# Patient Record
Sex: FEMALE | Race: WHITE | Employment: OTHER | URBAN - NONMETROPOLITAN AREA
[De-identification: names, ages, dates, MRNs, and addresses within clinical notes are randomized per-mention and may not be internally consistent; named-entity substitution may affect disease eponyms.]

---

## 2020-06-30 LAB
ALBUMIN SERPL-MCNC: 4.6 G/DL
ALP BLD-CCNC: 89 U/L
ALT SERPL-CCNC: 32 U/L
ANION GAP SERPL CALCULATED.3IONS-SCNC: 9 MMOL/L
AST SERPL-CCNC: 34 U/L
BASOPHILS ABSOLUTE: 0.1 /ΜL
BASOPHILS RELATIVE PERCENT: 0.7 %
BILIRUB SERPL-MCNC: 0.2 MG/DL (ref 0.1–1.4)
BILIRUBIN, URINE: NEGATIVE
BLOOD, URINE: NEGATIVE
BUN BLDV-MCNC: 7 MG/DL
CALCIUM SERPL-MCNC: 9.4 MG/DL
CHLORIDE BLD-SCNC: 101 MMOL/L
CLARITY: ABNORMAL
CO2: 28.7 MMOL/L
COLOR: YELLOW
CREAT SERPL-MCNC: 0.72 MG/DL
EOSINOPHILS ABSOLUTE: 0.1 /ΜL
EOSINOPHILS RELATIVE PERCENT: 1.3 %
GFR CALCULATED: >60
GLUCOSE BLD-MCNC: 106 MG/DL
GLUCOSE URINE: NEGATIVE
HCT VFR BLD CALC: 43.2 % (ref 36–46)
HEMOGLOBIN: 14.8 G/DL (ref 12–16)
KETONES, URINE: POSITIVE
LEUKOCYTE ESTERASE, URINE: NEGATIVE
LIPASE: 37 UNITS/L
LYMPHOCYTES ABSOLUTE: 2.6 /ΜL
LYMPHOCYTES RELATIVE PERCENT: 37.1 %
MCH RBC QN AUTO: 31 PG
MCHC RBC AUTO-ENTMCNC: 34 G/DL
MCV RBC AUTO: 91 FL
MONOCYTES ABSOLUTE: 0.6 /ΜL
MONOCYTES RELATIVE PERCENT: 8.5 %
NEUTROPHILS ABSOLUTE: 3.7 /ΜL
NEUTROPHILS RELATIVE PERCENT: 52.4 %
NITRITE, URINE: NEGATIVE
PDW BLD-RTO: 11.5 %
PH UA: 6 (ref 4.5–8)
PLATELET # BLD: 349 K/ΜL
PMV BLD AUTO: 7.5 FL
POTASSIUM SERPL-SCNC: 3.6 MMOL/L
PROTEIN UA: NEGATIVE
RBC # BLD: 4.72 10^6/ΜL
SODIUM BLD-SCNC: 139 MMOL/L
SPECIFIC GRAVITY, URINE: 1.01
TOTAL PROTEIN: 7.4
UROBILINOGEN, URINE: ABNORMAL
WBC # BLD: 7.1 10^3/ML

## 2022-05-31 SDOH — HEALTH STABILITY: PHYSICAL HEALTH: ON AVERAGE, HOW MANY MINUTES DO YOU ENGAGE IN EXERCISE AT THIS LEVEL?: 20 MIN

## 2022-05-31 SDOH — HEALTH STABILITY: PHYSICAL HEALTH: ON AVERAGE, HOW MANY DAYS PER WEEK DO YOU ENGAGE IN MODERATE TO STRENUOUS EXERCISE (LIKE A BRISK WALK)?: 3 DAYS

## 2022-05-31 ASSESSMENT — SOCIAL DETERMINANTS OF HEALTH (SDOH)

## 2022-06-01 ENCOUNTER — OFFICE VISIT (OUTPATIENT)
Dept: FAMILY MEDICINE CLINIC | Age: 77
End: 2022-06-01
Payer: COMMERCIAL

## 2022-06-01 VITALS
BODY MASS INDEX: 22.55 KG/M2 | SYSTOLIC BLOOD PRESSURE: 136 MMHG | WEIGHT: 148.8 LBS | DIASTOLIC BLOOD PRESSURE: 78 MMHG | HEART RATE: 78 BPM | TEMPERATURE: 97.3 F | HEIGHT: 68 IN | RESPIRATION RATE: 16 BRPM | OXYGEN SATURATION: 98 %

## 2022-06-01 DIAGNOSIS — Z13.220 SCREENING, LIPID: ICD-10-CM

## 2022-06-01 DIAGNOSIS — F43.9 SITUATIONAL STRESS: ICD-10-CM

## 2022-06-01 DIAGNOSIS — G47.00 INSOMNIA, UNSPECIFIED TYPE: ICD-10-CM

## 2022-06-01 DIAGNOSIS — Z00.00 HEALTHCARE MAINTENANCE: ICD-10-CM

## 2022-06-01 DIAGNOSIS — M85.852 OSTEOPENIA OF LEFT HIP: ICD-10-CM

## 2022-06-01 DIAGNOSIS — B35.1 ONYCHOMYCOSIS: ICD-10-CM

## 2022-06-01 DIAGNOSIS — Z76.89 ENCOUNTER TO ESTABLISH CARE: Primary | ICD-10-CM

## 2022-06-01 PROCEDURE — 1123F ACP DISCUSS/DSCN MKR DOCD: CPT | Performed by: FAMILY MEDICINE

## 2022-06-01 PROCEDURE — 99203 OFFICE O/P NEW LOW 30 MIN: CPT | Performed by: FAMILY MEDICINE

## 2022-06-01 SDOH — ECONOMIC STABILITY: FOOD INSECURITY: WITHIN THE PAST 12 MONTHS, THE FOOD YOU BOUGHT JUST DIDN'T LAST AND YOU DIDN'T HAVE MONEY TO GET MORE.: NEVER TRUE

## 2022-06-01 SDOH — ECONOMIC STABILITY: FOOD INSECURITY: WITHIN THE PAST 12 MONTHS, YOU WORRIED THAT YOUR FOOD WOULD RUN OUT BEFORE YOU GOT MONEY TO BUY MORE.: NEVER TRUE

## 2022-06-01 ASSESSMENT — PATIENT HEALTH QUESTIONNAIRE - PHQ9
2. FEELING DOWN, DEPRESSED OR HOPELESS: 0
1. LITTLE INTEREST OR PLEASURE IN DOING THINGS: 0
SUM OF ALL RESPONSES TO PHQ QUESTIONS 1-9: 0
SUM OF ALL RESPONSES TO PHQ9 QUESTIONS 1 & 2: 0
SUM OF ALL RESPONSES TO PHQ QUESTIONS 1-9: 0

## 2022-06-01 ASSESSMENT — SOCIAL DETERMINANTS OF HEALTH (SDOH): HOW HARD IS IT FOR YOU TO PAY FOR THE VERY BASICS LIKE FOOD, HOUSING, MEDICAL CARE, AND HEATING?: NOT HARD AT ALL

## 2022-06-01 NOTE — PROGRESS NOTES
Rajwinder Jennings (:  1945) is a 77 y.o. female,Established patient, here for evaluation of the following chief complaint(s):  New Patient       ASSESSMENT/PLAN:  1. Encounter to establish care  2. Osteopenia of left hip  3. Situational stress  -     sertraline (ZOLOFT) 50 MG tablet; Take 1 tablet by mouth daily, Disp-90 tablet, R-3Normal  4. Insomnia, unspecified type  5. Healthcare maintenance  -     Comprehensive Metabolic Panel; Future  -     Lipid Panel; Future  -     CBC with Auto Differential; Future  6. Screening, lipid  -     sertraline (ZOLOFT) 50 MG tablet; Take 1 tablet by mouth daily, Disp-90 tablet, R-3Normal  7. Onychomycosis    Healthy and active lady. She is welcomed to clinic. Lab update soon. Continue current medication -- her goal is later on to wean zoloft. She would like podiatry referral when she is ready. Return in about 1 year (around 2023). Subjective   SUBJECTIVE/OBJECTIVE:  HPI     Patient establishing with me for the first time. Was living in [place], [place]. Moving here to be closer to family.   last year. A lot of adjustment has occurred. [place] in [employer] has helped her through these difficult times. Used to live in [place]. Daughter in [place] and son here. She is looking forward to spending time with them. Last year started weaning HRT. Felt good and had no troubles with HRT however her previous physician recommended she wean. Takes zoloft for hot flashes. Then her  . She realizes the medication is helping buffer the intensity of emotions. Has helped her stay on track. Mammogram --  Normal 2022. DEXA -- hip has osteopenia. A year ago had dexa. Taking calcium with magnesium. Also vitamin D 2000 units. Also vitamin B-50 daily. Glucosamine chondroitin for joints has helped. Last labs -- a couple of years done for cholesterol. Was in hospital for actu gastritis, naproxen.   They wondered if GB, but EGD showed some irritation. 7/2020 normal HIDA. Healed slowly with medications and diet changes. Cardiac stress test then also negative. Vaccines -- [id number] booster. Pneumonia vaccines. -- not done shingles when younger   Dental care --  Every 6 months, doing well. Diet -- very healthy. Jose Bradley -- makes her own. Exercise -- in general active, steps in house of [place], garden/yard work. Walking. Sleep --  Struggling, tylenol pm. Melatonin some. Or unisom. Feeling cold feet. -- no raynauds  - podiatry needed in the future. She has fungal infection in nails. Active Ambulatory Problems     Diagnosis Date Noted    Osteopenia of left hip 06/01/2022    Situational stress 06/01/2022    Insomnia 06/01/2022    Onychomycosis 06/01/2022     Resolved Ambulatory Problems     Diagnosis Date Noted    No Resolved Ambulatory Problems     No Additional Past Medical History       History reviewed. No pertinent past medical history. History reviewed. No pertinent surgical history. Social History     Socioeconomic History    Marital status:      Spouse name: Not on file    Number of children: Not on file    Years of education: Not on file    Highest education level: Not on file   Occupational History    Not on file   Tobacco Use    Smoking status: Never Smoker    Smokeless tobacco: Never Used   Vaping Use    Vaping Use: Never used   Substance and Sexual Activity    Alcohol use: Yes    Drug use: Never    Sexual activity: Not Currently   Other Topics Concern    Not on file   Social History Narrative    Not on file     Social Determinants of Health     Financial Resource Strain: Low Risk     Difficulty of Paying Living Expenses: Not hard at all   Food Insecurity: No Food Insecurity    Worried About Running Out of Food in the Last Year: Never true    [address] in the Last Year: Never true   Transportation Needs:     Lack of Transportation (Medical):  Not on file    Lack of Transportation (Non-Medical): Not on file   Physical Activity: Insufficiently Active    Days of Exercise per Week: 3 days    Minutes of Exercise per Session: 20 min   Stress:     Feeling of Stress : Not on file   Social Connections:     Frequency of Communication with Friends and Family: Not on file    Frequency of Social Gatherings with Friends and Family: Not on file    Attends [employer] Services: Not on file    Active Member of Clubs or Organizations: Not on file    Attends Club or Organization Meetings: Not on file    Marital Status: Not on file   Intimate Partner Violence: Not At Risk    Fear of Current or Ex-Partner: No    Emotionally Abused: No    Physically Abused: No    Sexually Abused: No   Housing Stability:     Unable to Pay for Housing in the Last Year: Not on file    Number of [place] in the Last Year: Not on file    Unstable Housing in the Last Year: Not on file       Family History   Problem Relation Age of Onset    Stroke Mother     Heart Attack Father     Breast Cancer Sister        Review of Systems   Constitutional: Negative for fatigue and fever. Respiratory: Negative for shortness of breath. Cardiovascular: Negative for chest pain and leg swelling. Objective   Physical Exam  Constitutional:       General: She is not in acute distress. Appearance: Normal appearance. She is not ill-appearing. HENT:      Head: Normocephalic. Right Ear: Tympanic membrane, ear canal and external ear normal.      Left Ear: Tympanic membrane and external ear normal.      Nose: No congestion. Mouth/Throat:      Mouth: Mucous membranes are moist.   Eyes:      Extraocular Movements: Extraocular movements intact. Conjunctiva/sclera: Conjunctivae normal.      Pupils: Pupils are equal, round, and reactive to light. Cardiovascular:      Rate and Rhythm: Normal rate and regular rhythm. Heart sounds: No murmur heard.       Pulmonary:      Effort: Pulmonary effort is normal. No respiratory distress. Breath sounds: Normal breath sounds. No wheezing. Musculoskeletal:         General: No swelling. Right lower leg: No edema. Left lower leg: No edema. Neurological:      Mental Status: She is alert and oriented to person, place, and time. Psychiatric:         Mood and Affect: Mood normal.         Behavior: Behavior normal.          An electronic signature was used to authenticate this note.     --Hilaria Young MD

## 2022-06-04 ASSESSMENT — ENCOUNTER SYMPTOMS: SHORTNESS OF BREATH: 0

## 2022-06-27 ENCOUNTER — TELEPHONE (OUTPATIENT)
Dept: FAMILY MEDICINE CLINIC | Age: 77
End: 2022-06-27

## 2022-06-27 NOTE — TELEPHONE ENCOUNTER
Gus Metcalf called in requesting to get a muscle relaxer called in. She just moved from [place] last week and is having muscle spasms from all the moving and lifting things she shouldn't have been. She was in here June 1st to see Dr. Cecily Hi. Call into [facility].

## 2022-06-28 NOTE — TELEPHONE ENCOUNTER
It does not appear that a muscle relaxer was discussed at appt on 6/1. May use tylenol, ice, stretch, icy hot/bengay.     Will need appt to eval and discuss other meds, including potential side effects    Delia Carrera MD   (covering for Dr. bS Ivan)

## 2022-07-08 ENCOUNTER — HOSPITAL ENCOUNTER (OUTPATIENT)
Age: 77
Discharge: HOME | End: 2022-07-08
Payer: MEDICARE

## 2022-07-08 ENCOUNTER — TELEPHONE (OUTPATIENT)
Dept: FAMILY MEDICINE CLINIC | Age: 77
End: 2022-07-08

## 2022-07-08 ENCOUNTER — OFFICE VISIT (OUTPATIENT)
Dept: FAMILY MEDICINE CLINIC | Age: 77
End: 2022-07-08
Payer: MEDICARE

## 2022-07-08 VITALS
DIASTOLIC BLOOD PRESSURE: 64 MMHG | BODY MASS INDEX: 23.04 KG/M2 | SYSTOLIC BLOOD PRESSURE: 120 MMHG | TEMPERATURE: 97.7 F | HEIGHT: 68 IN | WEIGHT: 152 LBS | HEART RATE: 65 BPM | OXYGEN SATURATION: 99 %

## 2022-07-08 DIAGNOSIS — S46.012A STRAIN OF TENDON OF LEFT ROTATOR CUFF, INITIAL ENCOUNTER: ICD-10-CM

## 2022-07-08 DIAGNOSIS — M54.41 ACUTE MIDLINE LOW BACK PAIN WITH RIGHT-SIDED SCIATICA: Primary | ICD-10-CM

## 2022-07-08 DIAGNOSIS — Z00.00 HEALTHCARE MAINTENANCE: ICD-10-CM

## 2022-07-08 DIAGNOSIS — M25.512 ACUTE PAIN OF LEFT SHOULDER: ICD-10-CM

## 2022-07-08 DIAGNOSIS — S76.311A STRAIN OF RIGHT HAMSTRING, INITIAL ENCOUNTER: ICD-10-CM

## 2022-07-08 LAB
ALBUMIN SERPL-MCNC: 4.4 G/DL (ref 3.5–5.1)
ALP BLD-CCNC: 120 U/L (ref 38–126)
ALT SERPL-CCNC: 44 U/L (ref 11–66)
ANION GAP SERPL CALCULATED.3IONS-SCNC: 9 MEQ/L (ref 8–16)
AST SERPL-CCNC: 55 U/L (ref 5–40)
BASOPHILS # BLD: 1 %
BASOPHILS ABSOLUTE: 0.1 THOU/MM3 (ref 0–0.1)
BILIRUB SERPL-MCNC: 0.3 MG/DL (ref 0.3–1.2)
BUN BLDV-MCNC: 12 MG/DL (ref 7–22)
CALCIUM SERPL-MCNC: 9.4 MG/DL (ref 8.5–10.5)
CHLORIDE BLD-SCNC: 100 MEQ/L (ref 98–111)
CHOLESTEROL, TOTAL: 260 MG/DL (ref 100–199)
CO2: 27 MEQ/L (ref 23–33)
CREAT SERPL-MCNC: 0.7 MG/DL (ref 0.4–1.2)
EOSINOPHIL # BLD: 1.7 %
EOSINOPHILS ABSOLUTE: 0.1 THOU/MM3 (ref 0–0.4)
ERYTHROCYTE [DISTWIDTH] IN BLOOD BY AUTOMATED COUNT: 11.9 % (ref 11.5–14.5)
ERYTHROCYTE [DISTWIDTH] IN BLOOD BY AUTOMATED COUNT: 41.4 FL (ref 35–45)
GFR SERPL CREATININE-BSD FRML MDRD: 81 ML/MIN/1.73M2
GLUCOSE BLD-MCNC: 118 MG/DL (ref 70–108)
HCT VFR BLD CALC: 42.1 % (ref 37–47)
HDLC SERPL-MCNC: 69 MG/DL
HEMOGLOBIN: 13.8 GM/DL (ref 12–16)
IMMATURE GRANS (ABS): 0.01 THOU/MM3 (ref 0–0.07)
IMMATURE GRANULOCYTES: 0.2 %
LDL CHOLESTEROL CALCULATED: 170 MG/DL
LYMPHOCYTES # BLD: 26.5 %
LYMPHOCYTES ABSOLUTE: 1.6 THOU/MM3 (ref 1–4.8)
MCH RBC QN AUTO: 31.4 PG (ref 26–33)
MCHC RBC AUTO-ENTMCNC: 32.8 GM/DL (ref 32.2–35.5)
MCV RBC AUTO: 95.7 FL (ref 81–99)
MONOCYTES # BLD: 7.2 %
MONOCYTES ABSOLUTE: 0.4 THOU/MM3 (ref 0.4–1.3)
NUCLEATED RED BLOOD CELLS: 0 /100 WBC
PLATELET # BLD: 287 THOU/MM3 (ref 130–400)
PMV BLD AUTO: 10.2 FL (ref 9.4–12.4)
POTASSIUM SERPL-SCNC: 4.9 MEQ/L (ref 3.5–5.2)
RBC # BLD: 4.4 MILL/MM3 (ref 4.2–5.4)
SEG NEUTROPHILS: 63.4 %
SEGMENTED NEUTROPHILS ABSOLUTE COUNT: 3.7 THOU/MM3 (ref 1.8–7.7)
SODIUM BLD-SCNC: 136 MEQ/L (ref 135–145)
TOTAL PROTEIN: 6.9 G/DL (ref 6.1–8)
TRIGL SERPL-MCNC: 107 MG/DL (ref 0–199)
WBC # BLD: 5.9 THOU/MM3 (ref 4.8–10.8)

## 2022-07-08 PROCEDURE — 99214 OFFICE O/P EST MOD 30 MIN: CPT | Performed by: FAMILY MEDICINE

## 2022-07-08 PROCEDURE — 36415 COLL VENOUS BLD VENIPUNCTURE: CPT

## 2022-07-08 PROCEDURE — 1123F ACP DISCUSS/DSCN MKR DOCD: CPT | Performed by: FAMILY MEDICINE

## 2022-07-08 PROCEDURE — 80061 LIPID PANEL: CPT

## 2022-07-08 PROCEDURE — 85025 COMPLETE CBC W/AUTO DIFF WBC: CPT

## 2022-07-08 PROCEDURE — 80053 COMPREHEN METABOLIC PANEL: CPT

## 2022-07-08 RX ORDER — CYCLOBENZAPRINE HCL 5 MG
5 TABLET ORAL NIGHTLY PRN
Qty: 30 TABLET | Refills: 0 | Status: SHIPPED | OUTPATIENT
Start: 2022-07-08 | End: 2022-07-18

## 2022-07-08 NOTE — PROGRESS NOTES
Florentino Foreman (:  1945) is a 77 y.o. female,Established patient, here for evaluation of the following chief complaint(s):  Back Pain       ASSESSMENT/PLAN:  1. Acute midline low back pain with right-sided sciatica  2. Strain of right hamstring, initial encounter  3. Acute pain of left shoulder  -     diclofenac sodium (VOLTAREN) 1 % GEL; Apply 2 g topically 4 times daily as needed for Pain, Topical, 4 TIMES DAILY PRN Starting 2022, Disp-200 g, R-5, Normal  4. Strain of tendon of left rotator cuff, initial encounter  -     diclofenac sodium (VOLTAREN) 1 % GEL; Apply 2 g topically 4 times daily as needed for Pain, Topical, 4 TIMES DAILY PRN Starting 2022, Disp-200 g, R-5, Normal    Patient appears to have some lumbar degenerative issues affecting her right gluteal and hamstring muscles. Specific recommendations were made to improve this. She will follow these exercises. Flexeril was sent to the pharmacy. Left shoulder needs improvement of range of motion and then strengthening. Specific exercises were shown for this as well. Voltaren gel will be tried. Return in about 3 months (around 10/8/2022). Subjective   SUBJECTIVE/OBJECTIVE:  HPI     Ongoing lower back pain and tail bone pain. Still unpacking from move. A lot of moving and rearranging  Stumbled and tripped 3 times  Good shoes. Sleeps okay. No bladder and bowel trouble. No saddle anesthesia. Injured her tail bone when she was a teenage. Has been told xray shows degenerative. No stenosis to her knowledge  Spasm into right leg. No tingling/numbness into legs. But driving will worse with aching. Flexeril has helped on and off, 2018 last script. Only takes at night. Left shoulder trouble, past year  Worse pain, getting bra or picking up into cupboards. No tingling/numbness. Before move was worse. No injury/trauma. Review of Systems   no fever/chills.      Objective   Physical Exam   In general she appears to be in no acute distress, awake and oriented. Back examination feels some very mild discomfort in the paraspinal muscles of the lumbar region. Straight leg raise test is negative bilaterally but the hamstring is less flexible on the right compared to the left. Hip movements and range of motion is pretty normal but when involving the gluteal muscles there is discomfort. Piriformis test has been having but it did not radiate into the legs. An electronic signature was used to authenticate this note.     --Alice Shaw MD

## 2022-07-08 NOTE — PATIENT INSTRUCTIONS
Patient Education        Gluteal Strain: Rehab Exercises  Introduction  Here are some examples of exercises for you to try. The exercises may be suggested for a condition or for rehabilitation. Start each exercise slowly. Ease off the exercises if you start to have pain. You will be told when to start these exercises and which ones will work bestfor you. How to do the exercises  Hip rotator stretch    1. Lie on your back with both knees bent and your feet flat on the floor. 2. Put the ankle of your affected leg on your opposite thigh near your knee. 3. Use your hand to gently push the knee of your affected leg away from your body until you feel a gentle stretch around your hip. 4. Hold the stretch for 15 to 30 seconds. 5. Repeat 2 to 4 times. 6. Repeat steps 1 through 5, but this time use your hand to gently pull your knee toward your opposite shoulder. 7. Switch legs and repeat steps 1 through 6, even if only one hip is sore. Seated hip rotator stretch    1. Sit in a sturdy chair. 2. Cross your affected leg over your knee, resting your foot on top of your knee. 3. Keep your back straight, and slowly lean forward until you feel a stretch in your hip. 4. Hold for 15 to 30 seconds. 5. Switch legs and repeat steps 1 through 4 on your other side. 6. Repeat 2 to 4 times. Hamstring stretch (lying down)    1. Lie flat on your back with your legs straight. If you feel discomfort in your back, place a small towel roll under your lower back. 2. Holding the back of your affected leg, lift your leg straight up and toward your body until you feel a stretch at the back of your thigh. 3. Hold the stretch for at least 30 seconds. 4. Repeat 2 to 4 times. 5. Switch legs and repeat steps 1 through 4, even if only one hip is sore. Bridging    1. Lie on your back with both knees bent. Your knees should be bent about 90 degrees.   2. Then push your feet into the floor, squeeze your buttocks, and lift your hips off the floor until your shoulders, hips, and knees are all in a straight line. 3. Hold for about 6 seconds as you continue to breathe normally, and then slowly lower your hips back down to the floor and rest for up to 10 seconds. 4. Repeat 8 to 12 times. Single-leg bridge    1. Lie on your back, with your arms at your sides. 2. Bend one knee, and keep that foot flat on the floor. The other leg should be straight. 3. Raise the straight leg up so that the knee is level with the bent knee. 4. Tighten your belly muscles by pulling your belly button in toward your spine. Lift your buttocks up and be careful not to let your hips drop down. 5. Hold for about 6 seconds as you continue to breathe normally, and then slowly lower your hips back down to the floor. 6. Switch legs and repeat steps 1 though 5.  7. Repeat 8 to 12 times. Follow-up care is a key part of your treatment and safety. Be sure to make and go to all appointments, and call your doctor if you are having problems. It's also a good idea to know your test results and keep alist of the medicines you take. Where can you learn more? Go to https://Connollypeduuin.Sodbuster. org and sign in to your [email or web address] account. Enter T300 in the Eons box to learn more about \"Gluteal Strain: Rehab Exercises. \"     If you do not have an account, please click on the \"Sign Up Now\" link. Current as of: March 9, 2022               Content Version: 13.3  © 2006-2022 Healthwise, Incorporated. Care instructions adapted under license by [facility] [address] If you have questions about a medical condition or this instruction, always ask your healthcare professional. [place] [address] any warranty or liability for your use of this information. Patient Education        Shoulder Exercises  Introduction  Here are some examples of exercises for you to try. The exercises may be suggested for a condition or for rehabilitation.  Start each exercise slowly. Ease off the exercises if you start to have pain. You will be told when to start these exercises and which ones will work bestfor you. How to do the exercises  Posterior stretching exercise    1. Hold the elbow of your injured arm with your other hand. 2. Use your hand to pull your injured arm gently up and across your body. You will feel a gentle stretch across the back of your injured shoulder. 3. Hold for at least 15 to 30 seconds. Then slowly lower your arm. 4. Repeat 2 to 4 times. Up-the-back stretch    Your doctor or physical therapist may want you to wait to do this stretch until you have regained most of your range of motion and strength. You can do this stretch in different ways. Hold any of these stretches for at least 15 to 30seconds. Repeat them 2 to 4 times. 1. Light stretch: Put your hand in your back pocket. Let it rest there to stretch your shoulder. 2. Moderate stretch: With your other hand, hold your injured arm (palm outward) behind your back by the wrist. Pull your arm up gently to stretch your shoulder. 3. Advanced stretch: Put a towel over your other shoulder. Put the hand of your injured arm behind your back. Now hold the back end of the towel. With the other hand, hold the front end of the towel in front of your body. Pull gently on the front end of the towel. This will bring your hand farther up your back to stretch your shoulder. Overhead stretch    1. Standing about an arm's length away, grasp onto a solid surface. You could use a countertop, a doorknob, or the back of a sturdy chair. 2. With your knees slightly bent, bend forward with your arms straight. Lower your upper body, and let your shoulders stretch. 3. As your shoulders are able to stretch farther, you may need to take a step or two backward. 4. Hold for at least 15 to 30 seconds. Then stand up and relax.  If you had stepped back during your stretch, step forward so you can keep your hands on the solid surface. 5. Repeat 2 to 4 times. Shoulder flexion (lying down)    To make a wand for this exercise, use a piece of PVC pipe or a broom handlewith the broom removed. Make the wand about a foot wider than your shoulders. 1. Lie on your back, holding a wand with both hands. Your palms should face down as you hold the wand. 2. Keeping your elbows straight, slowly raise your arms over your head. Raise them until you feel a stretch in your shoulders, upper back, and chest.  3. Hold for 15 to 30 seconds. 4. Repeat 2 to 4 times. Shoulder rotation (lying down)    To make a wand for this exercise, use a piece of PVC pipe or a broom handlewith the broom removed. Make the wand about a foot wider than your shoulders. 1. Lie on your back. Hold a wand with both hands with your elbows bent and palms up. 2. Keep your elbows close to your body, and move the wand across your body toward the sore arm. 3. Hold for 8 to 12 seconds. 4. Repeat 2 to 4 times. Shoulder blade squeeze    1. Stand with your arms at your sides, and squeeze your shoulder blades together. Do not raise your shoulders up as you squeeze. 2. Hold 6 seconds. 3. Repeat 8 to 12 times. Shoulder flexor and extensor exercise    These are isometric exercises. That means you contract your muscles withoutactually moving. 1. Push forward (flex): Stand facing a wall or doorjamb, about 6 inches or less back. Hold your injured arm against your body. Make a closed fist with your thumb on top. Then gently push your hand forward into the wall with about 25% to 50% of your strength. Don't let your body move backward as you push. Hold for about 6 seconds. Relax for a few seconds. Repeat 8 to 12 times. 2. Push backward (extend): Stand with your back flat against a wall. Your upper arm should be against the wall, with your elbow bent 90 degrees (your hand straight ahead). Push your elbow gently back against the wall with about 25% to 50% of your strength.  Don't let your body move forward as you push. Hold for about 6 seconds. Relax for a few seconds. Repeat 8 to 12 times. Scapular exercise: Wall push-ups    This exercise is best done with your fingers somewhat turned out, rather thanstraight up and down. 1. Stand facing a wall, about 12 inches to 18 inches away. 2. Place your hands on the wall at shoulder height. 3. Slowly bend your elbows and bring your face to the wall. Keep your back and hips straight. 4. Push back to where you started. 5. Repeat 8 to 12 times. 6. When you can do this exercise against a wall comfortably, you can try it against a counter. You can then slowly progress to the end of a couch, then to a sturdy chair, and finally to the floor. Scapular exercise: Retraction    For this exercise, you will need elastic exercise material, such as surgicaltubing or Thera-Band. 1. Put the band around a solid object at about waist level. (A bedpost will work well.) Each hand should hold an end of the band. 2. With your elbows at your sides and bent to 90 degrees, pull the band back. Your shoulder blades should move toward each other. Then move your arms back where you started. 3. Repeat 8 to 12 times. 4. If you have good range of motion in your shoulders, try this exercise with your arms lifted out to the sides. Keep your elbows at a 90-degree angle. Raise the elastic band up to about shoulder level. Pull the band back to move your shoulder blades toward each other. Then move your arms back where you started. Internal rotator strengthening exercise    1. Start by tying a piece of elastic exercise material to a doorknob. You can use surgical tubing or Thera-Band. 2. Stand or sit with your shoulder relaxed and your elbow bent 90 degrees. Your upper arm should rest comfortably against your side. Squeeze a rolled towel between your elbow and your body for comfort. This will help keep your arm at your side.   3. Hold one end of the elastic band in the hand of the painful arm. 4. Slowly rotate your forearm toward your body until it touches your belly. Slowly move it back to where you started. 5. Keep your elbow and upper arm firmly tucked against the towel roll or at your side. 6. Repeat 8 to 12 times. External rotator strengthening exercise    1. Start by tying a piece of elastic exercise material to a doorknob. You can use surgical tubing or Thera-Band. (You may also hold one end of the band in each hand.)  2. Stand or sit with your shoulder relaxed and your elbow bent 90 degrees. Your upper arm should rest comfortably against your side. Squeeze a rolled towel between your elbow and your body for comfort. This will help keep your arm at your side. 3. Hold one end of the elastic band with the hand of the painful arm. 4. Start with your forearm across your belly. Slowly rotate the forearm out away from your body. Keep your elbow and upper arm tucked against the towel roll or the side of your body until you begin to feel tightness in your shoulder. Slowly move your arm back to where you started. 5. Repeat 8 to 12 times. Follow-up care is a key part of your treatment and safety. Be sure to make and go to all appointments, and call your doctor if you are having problems. It's also a good idea to know your test results and keep alist of the medicines you take. Where can you learn more? Go to https://SilistixpelatoyaPodTech.BeautyTicket.com. org and sign in to your Attainia account. Enter C294 in the [facility] box to learn more about \"Shoulder Bursitis: Exercises. \"     If you do not have an account, please click on the \"Sign Up Now\" link. Current as of: March 9, 2022               Content Version: 13.3  © 0100-9690 Healthwise, Incorporated. Care instructions adapted under license by [facility] ([facility]).  If you have questions about a medical condition or this instruction, always ask your healthcare professional. Raghavendra Alfonso disclaims any warranty or liability for your use of this information.

## 2022-07-08 NOTE — TELEPHONE ENCOUNTER
Amber Pump calls and she thought she was going to get a muscle relaxer sent in to [facility].

## 2022-10-14 ENCOUNTER — OFFICE VISIT (OUTPATIENT)
Dept: FAMILY MEDICINE CLINIC | Age: 77
End: 2022-10-14
Payer: MEDICARE

## 2022-10-14 VITALS
OXYGEN SATURATION: 99 % | HEIGHT: 68 IN | WEIGHT: 150 LBS | HEART RATE: 67 BPM | SYSTOLIC BLOOD PRESSURE: 122 MMHG | TEMPERATURE: 97.7 F | BODY MASS INDEX: 22.73 KG/M2 | DIASTOLIC BLOOD PRESSURE: 72 MMHG

## 2022-10-14 DIAGNOSIS — Z23 NEED FOR VACCINATION: ICD-10-CM

## 2022-10-14 DIAGNOSIS — Z00.00 WELCOME TO MEDICARE PREVENTIVE VISIT: Primary | ICD-10-CM

## 2022-10-14 DIAGNOSIS — S76.012A STRAIN OF LEFT PSOAS MUSCLE, INITIAL ENCOUNTER: ICD-10-CM

## 2022-10-14 PROCEDURE — 1123F ACP DISCUSS/DSCN MKR DOCD: CPT | Performed by: FAMILY MEDICINE

## 2022-10-14 PROCEDURE — G0402 INITIAL PREVENTIVE EXAM: HCPCS | Performed by: FAMILY MEDICINE

## 2022-10-14 PROCEDURE — G0008 ADMIN INFLUENZA VIRUS VAC: HCPCS | Performed by: FAMILY MEDICINE

## 2022-10-14 PROCEDURE — 90694 VACC AIIV4 NO PRSRV 0.5ML IM: CPT | Performed by: FAMILY MEDICINE

## 2022-10-14 RX ORDER — TERBINAFINE HYDROCHLORIDE 250 MG/1
TABLET ORAL
COMMUNITY
Start: 2022-09-12

## 2022-10-14 ASSESSMENT — PATIENT HEALTH QUESTIONNAIRE - PHQ9
SUM OF ALL RESPONSES TO PHQ QUESTIONS 1-9: 0
1. LITTLE INTEREST OR PLEASURE IN DOING THINGS: 0
2. FEELING DOWN, DEPRESSED OR HOPELESS: 0
SUM OF ALL RESPONSES TO PHQ QUESTIONS 1-9: 0
SUM OF ALL RESPONSES TO PHQ9 QUESTIONS 1 & 2: 0
SUM OF ALL RESPONSES TO PHQ QUESTIONS 1-9: 0
SUM OF ALL RESPONSES TO PHQ QUESTIONS 1-9: 0

## 2022-10-14 ASSESSMENT — LIFESTYLE VARIABLES
HOW MANY STANDARD DRINKS CONTAINING ALCOHOL DO YOU HAVE ON A TYPICAL DAY: 1 OR 2
HOW OFTEN DO YOU HAVE A DRINK CONTAINING ALCOHOL: MONTHLY OR LESS

## 2022-10-14 NOTE — PROGRESS NOTES
After obtaining consent, and per orders of Rosemary Thurman MD  Immunization(s) and/or medication(s) given during visit by Jass Reyes, [address] [place] [address]              Immunizations Administered       Name Date Dose Route    Influenza, FLUAD, (age 72 y+), Adjuvanted, 0.5mL 10/14/2022 0.5 mL Intramuscular    Site: Deltoid- Left    Lot: [id number]    NDC: 16231-968-43        Vaccine Information Sheet, \"Influenza - Inactivated\"  given to Holly Short, or parent/legal guardian of  Holly Short and verbalized understanding. Patient responses:    Have you ever had a reaction to a flu vaccine? No  Do you have an allergy to eggs, neomycin or polymixin? No  Do you have an allergy to Thimerosal, contact lens solution, or Merthiolate? No  Have you ever had Guillian [place] Syndrome? No  Do you have any current illness? No  Do you have a temperature above 100 degrees? No  Are you pregnant? No  If pregnant, permission obtained from physician? Do you have an active neurological disorder? No      Flu vaccine given per order. Please see immunization tab.

## 2022-10-14 NOTE — PATIENT INSTRUCTIONS
Staff -- please check records for pneumonia vaccines x 2 ([id number]  and prevnar 13)    Check out Psoas muscle stretches to help with hip.    14 MIN Psoas, IT & Hips Stretch with Zoila [email or web address].[email or web address]      Personalized Preventive Plan for Coleman Mclean - 10/14/2022  Medicare offers a range of preventive health benefits. Some of the tests and screenings are paid in full while other may be subject to a deductible, co-insurance, and/or copay. Some of these benefits include a comprehensive review of your medical history including lifestyle, illnesses that may run in your family, and various assessments and screenings as appropriate. After reviewing your medical record and screening and assessments performed today your provider may have ordered immunizations, labs, imaging, and/or referrals for you. A list of these orders (if applicable) as well as your Preventive Care list are included within your After Visit Summary for your review. Other Preventive Recommendations:    A preventive eye exam performed by an eye specialist is recommended every 1-2 years to screen for glaucoma; cataracts, macular degeneration, and other eye disorders. A preventive dental visit is recommended every 6 months. Try to get at least 150 minutes of exercise per week or 10,000 steps per day on a pedometer . Order or download the FREE \"Exercise & Physical Activity: Your Everyday Guide\" from [employer]. Call 6-206.178.7953 or search [employer] online. You need 2520-8097 mg of calcium and 4969-4790 IU of vitamin D per day. It is possible to meet your calcium requirement with diet alone, but a vitamin D supplement is usually necessary to meet this goal.  When exposed to the sun, use a sunscreen that protects against both UVA and UVB radiation with an SPF of 30 or greater.  Reapply every 2 to 3 hours or after sweating, drying off with a towel, or swimming. Always wear a seat belt when traveling in a car. Always wear a helmet when riding a bicycle or motorcycle.

## 2022-10-14 NOTE — PROGRESS NOTES
Medicare Annual Wellness Visit    Dee Velazquez is here for 3 Month Follow-Up    Assessment & Plan   Welcome to Medicare preventive visit  Need for vaccination  -     Influenza, FLUAD, (age 72 y+), IM, Preservative Free, 0.5 mL  Left hip pain    Recommendations for Preventive Services Due: see orders and patient instructions/AVS.    Given specific hip-psoas muscles. Will review and obtain records if necessary for pneumo vaccines    Labs normally done in July. These are within acceptable limits except for high LDL. With high HDL and low triglycerides, benefit of statin is less clear. Recommended screening schedule for the next 5-10 years is provided to the patient in written form: see Patient Instructions/AVS.     Return in 6 months (on 4/14/2023). Subjective     Doing well. Some back trouble as noted below. Patient's complete Health Risk Assessment and screening values have been reviewed and are found in Flowsheets. The following problems were reviewed today and where indicated follow up appointments were made and/or referrals ordered. Positive Risk Factor Screenings with Interventions:             General Health and ACP:  General  In general, how would you say your health is?: Very Good  In the past 7 days, have you experienced any of the following: New or Increased Pain, New or Increased Fatigue, Loneliness, Social Isolation, Stress or Anger?: (!) Yes  Select all that apply: (!) New or Increased Pain (started yoga slight back pain)  Do you get the social and emotional support that you need?: Yes  Do you have a Living Will?: Yes    Advance Directives       Power of  Living Will ACP-Advance Directive ACP-Power of     Not on File Not on File Not on File Not on File        General Health Risk Interventions:  Something aggravated on left side. Mostly when laying down and then on down along side and back of leg. This am doing well.               Objective   Vitals:    10/14/22 4561 BP: 122/72   Site: Left Upper Arm   Position: Sitting   Cuff Size: Medium Adult   Pulse: 67   Temp: 97.7 °F (36.5 °C)   SpO2: 99%   Weight: 150 lb (68 kg)   Height: 5' 8\" (1.727 m)      Body mass index is 22.81 kg/m². Gen: NAD, AAO x 3, coherent, pleasant  CTAB. RRR. Good balance and ROM of hips normal, location of intermittent pain is on low left lumbar back, then along IT band and and sciatic. No weakness. Neg SLRT. Immunization History   Administered Date(s) Administered    COVID-19, PFIZER PURPLE top, DILUTE for use, (age 15 y+), 30mcg/0.3mL 01/29/2021, 05/07/2021           Allergies   Allergen Reactions    Demerol [Meperidine Hcl] Nausea And Vomiting     Prior to Visit Medications    Medication Sig Taking?  Authorizing Provider   terbinafine (LAMISIL) 250 MG tablet take 1 tablet by mouth once daily Yes Historical Provider, MD   diclofenac sodium (VOLTAREN) 1 % GEL Apply 2 g topically 4 times daily as needed for Pain Yes Martha Aparicio MD   sertraline (ZOLOFT) 50 MG tablet Take 1 tablet by mouth daily  Patient taking differently: Take 25 mg by mouth daily 0.5 Tablet daily Yes Martha Aparicio MD       [facility] (Including outside providers/suppliers regularly involved in providing care):   Patient Care Team:  Martha Aparicio MD as PCP - General (Family Medicine)  Martha Aparicio MD as PCP - [facility] Empaneled Provider     Reviewed and updated this visit:  Tobacco  Allergies  Meds  Problems  Med Hx  Surg Hx  Soc Hx  Fam Hx

## 2022-10-30 ENCOUNTER — HOSPITAL ENCOUNTER (EMERGENCY)
Age: 77
Discharge: HOME | End: 2022-10-30
Payer: MEDICARE

## 2022-10-30 VITALS
RESPIRATION RATE: 16 BRPM | TEMPERATURE: 97.9 F | SYSTOLIC BLOOD PRESSURE: 126 MMHG | DIASTOLIC BLOOD PRESSURE: 58 MMHG | OXYGEN SATURATION: 99 % | HEART RATE: 76 BPM

## 2022-10-30 DIAGNOSIS — M62.838 SPASM OF MUSCLE: ICD-10-CM

## 2022-10-30 DIAGNOSIS — M79.605 LEFT LEG PAIN: Primary | ICD-10-CM

## 2022-10-30 PROCEDURE — 99213 OFFICE O/P EST LOW 20 MIN: CPT | Performed by: NURSE PRACTITIONER

## 2022-10-30 PROCEDURE — 99213 OFFICE O/P EST LOW 20 MIN: CPT

## 2022-10-30 RX ORDER — NAPROXEN 500 MG/1
500 TABLET ORAL 2 TIMES DAILY PRN
Qty: 60 TABLET | Refills: 0 | Status: SHIPPED | OUTPATIENT
Start: 2022-10-30 | End: 2022-12-01 | Stop reason: SDUPTHER

## 2022-10-30 RX ORDER — TIZANIDINE HYDROCHLORIDE 4 MG/1
4 CAPSULE, GELATIN COATED ORAL 3 TIMES DAILY PRN
Qty: 15 CAPSULE | Refills: 0 | Status: SHIPPED | OUTPATIENT
Start: 2022-10-30 | End: 2022-11-05 | Stop reason: SDUPTHER

## 2022-10-30 ASSESSMENT — ENCOUNTER SYMPTOMS
CONSTIPATION: 0
VOMITING: 0
COUGH: 0
BLOOD IN STOOL: 0
WHEEZING: 0
NAUSEA: 0
SHORTNESS OF BREATH: 0
ABDOMINAL PAIN: 0
EYE PAIN: 0
DIARRHEA: 0
RHINORRHEA: 0

## 2022-10-30 ASSESSMENT — PAIN - FUNCTIONAL ASSESSMENT: PAIN_FUNCTIONAL_ASSESSMENT: 0-10

## 2022-10-30 ASSESSMENT — PAIN SCALES - GENERAL: PAINLEVEL_OUTOF10: 5

## 2022-10-30 NOTE — DISCHARGE INSTRUCTIONS
Go to ER for worsening symptoms, chest pain, shortness of breath, inability keep liquids down, inability urinate for greater than 8 hours or numbness and tingling in the extremities. May take Tylenol or ibuprofen as needed for pain. Follow-up with your primary care provider.

## 2022-10-30 NOTE — ED NOTES
To [facility] with complaints of left leg pain for a week. States no real injury. Started hurting the day after she was lifting heavy blocks and mowing. Pain from buttock down to calf.  States that she took an old naproxen around 4 today and has relieved the pain     Salma Teixeira RN  10/30/22 0199

## 2022-10-30 NOTE — ED PROVIDER NOTES
[phone]       Chief Complaint   Patient presents with    Leg Pain     Left leg pain. Nurses Notes reviewed and I agree except as noted in the HPI. HISTORY OF PRESENT ILLNESS   Patricia Thacker is a 69 y.o. female who c  She states that she had a massage and it did help, but the pain got worse last night. She states that the pain is usually just when she is down to rest.  Patient states it starts in the muscle in her left hip and is like a spasm. Patient is able to move the extremity without difficulty at the hip, knee and foot. Sensation is intact all the way down to the toes. Pedal pulse and posterior tibialis pulse is 2+. Patient denies other symptoms including chest pain or shortness of breath. REVIEW OF SYSTEMS     Review of Systems   Constitutional:  Negative for appetite change, chills, fatigue, fever and unexpected weight change. HENT:  Negative for ear pain and rhinorrhea. Eyes:  Negative for pain and visual disturbance. Respiratory:  Negative for cough, shortness of breath and wheezing. Cardiovascular:  Negative for chest pain, palpitations and leg swelling. Gastrointestinal:  Negative for abdominal pain, blood in stool, constipation, diarrhea, nausea and vomiting. Genitourinary:  Negative for dysuria, frequency and hematuria. Musculoskeletal:  Positive for arthralgias. Negative for joint swelling and neck stiffness. Skin:  Negative for rash. Neurological:  Negative for dizziness, syncope, weakness, light-headedness and headaches. Hematological:  Does not bruise/bleed easily. PAST MEDICAL HISTORY   History reviewed. No pertinent past medical history. SURGICAL HISTORY     Patient  has no past surgical history on file.     CURRENT MEDICATIONS       Previous Medications    DICLOFENAC SODIUM (VOLTAREN) 1 % GEL    Apply 2 g topically 4 times daily as needed for Pain    SERTRALINE (ZOLOFT) 50 MG TABLET    Take 1 tablet by mouth daily    TERBINAFINE (LAMISIL) 250 MG TABLET    take 1 tablet by mouth once daily       ALLERGIES     Patient is is allergic to demerol [meperidine hcl]. FAMILY HISTORY     Patient'sfamily history includes Breast Cancer in her sister; Heart Attack in her father; Stroke in her mother. SOCIAL HISTORY     Patient  reports that she has never smoked. She has never used smokeless tobacco. She reports current alcohol use. She reports that she does not use drugs. PHYSICAL EXAM     ED TRIAGE VITALS  BP: (!) 126/58, Temp: 97.9 °F (36.6 °C), Heart Rate: 76, Resp: 16, SpO2: 99 %  Physical Exam  Vitals and nursing note reviewed. Constitutional:       Appearance: She is not diaphoretic. HENT:      Head: Normocephalic and atraumatic. Right Ear: External ear normal.      Left Ear: External ear normal.   Eyes:      Conjunctiva/sclera: Conjunctivae normal.   Pulmonary:      Effort: Pulmonary effort is normal. No respiratory distress. Abdominal:      General: There is no distension. Musculoskeletal:      Cervical back: Normal range of motion. Left hip: Tenderness present. No bony tenderness. Normal range of motion. Normal strength. Left upper leg: No deformity, tenderness or bony tenderness. Left knee: No swelling. Normal range of motion. Skin:     General: Skin is warm and dry. Neurological:      Mental Status: She is alert. DIAGNOSTIC RESULTS   Labs:No results found for this visit on 10/30/22. IMAGING:  No orders to display     URGENT CARE COURSE:        MDM      Patient is to urgent care with complaint of left leg pain. Patient describes the pain as a muscle tightening or spasm. We will treat with tizanidine. Patient is instructed to take this as needed and be careful as it may make her tired. Patient also given a prescription for naproxen to help with anti-inflammatory.     Go to ER for worsening symptoms, chest pain, shortness of breath, inability keep liquids down, inability urinate for greater than 8 hours or numbness and tingling in the extremities. May take Tylenol or ibuprofen as needed for pain. Follow-up with your primary care provider. Medications - No data to display  PROCEDURES:    Procedures    FINALIMPRESSION      1. Left leg pain    2.  Spasm of muscle        DISPOSITION/PLAN   DISPOSITION Decision To Discharge 10/30/2022 10:25:02 AM    PATIENT REFERRED TO:  Lazarus Osgood, MD  [address] [place] [address]  [phone]    In 2 days    DISCHARGE MEDICATIONS:  New Prescriptions    NAPROXEN (NAPROSYN) 500 MG TABLET    Take 1 tablet by mouth 2 times daily as needed for Pain    TIZANIDINE (ZANAFLEX) 4 MG CAPSULE    Take 1 capsule by mouth 3 times daily as needed for Muscle spasms     Current Discharge Medication List          SIDDHARTHA Zhang CNP, APRN - CNP  10/30/22 1035

## 2022-10-31 ENCOUNTER — OFFICE VISIT (OUTPATIENT)
Dept: FAMILY MEDICINE CLINIC | Age: 77
End: 2022-10-31
Payer: MEDICARE

## 2022-10-31 VITALS
BODY MASS INDEX: 23.64 KG/M2 | SYSTOLIC BLOOD PRESSURE: 130 MMHG | HEART RATE: 82 BPM | RESPIRATION RATE: 16 BRPM | HEIGHT: 68 IN | DIASTOLIC BLOOD PRESSURE: 80 MMHG | WEIGHT: 156 LBS | OXYGEN SATURATION: 99 % | TEMPERATURE: 97.1 F

## 2022-10-31 DIAGNOSIS — M54.32 SCIATICA OF LEFT SIDE: Primary | ICD-10-CM

## 2022-10-31 PROCEDURE — 99213 OFFICE O/P EST LOW 20 MIN: CPT | Performed by: NURSE PRACTITIONER

## 2022-10-31 PROCEDURE — 1123F ACP DISCUSS/DSCN MKR DOCD: CPT | Performed by: NURSE PRACTITIONER

## 2022-10-31 PROCEDURE — 96372 THER/PROPH/DIAG INJ SC/IM: CPT | Performed by: NURSE PRACTITIONER

## 2022-10-31 RX ORDER — TIZANIDINE 4 MG/1
TABLET ORAL
COMMUNITY
Start: 2022-10-30 | End: 2022-11-05

## 2022-10-31 RX ORDER — METHYLPREDNISOLONE ACETATE 40 MG/ML
80 INJECTION, SUSPENSION INTRA-ARTICULAR; INTRALESIONAL; INTRAMUSCULAR; SOFT TISSUE ONCE
Status: COMPLETED | OUTPATIENT
Start: 2022-10-31 | End: 2022-10-31

## 2022-10-31 RX ORDER — METHYLPREDNISOLONE ACETATE 80 MG/ML
80 INJECTION, SUSPENSION INTRA-ARTICULAR; INTRALESIONAL; INTRAMUSCULAR; SOFT TISSUE ONCE
Status: DISCONTINUED | OUTPATIENT
Start: 2022-10-31 | End: 2022-11-05

## 2022-10-31 RX ADMIN — METHYLPREDNISOLONE ACETATE 80 MG: 40 INJECTION, SUSPENSION INTRA-ARTICULAR; INTRALESIONAL; INTRAMUSCULAR; SOFT TISSUE at 16:06

## 2022-10-31 NOTE — PROGRESS NOTES
Pierre Hardy (:  1945) is a 69 y.o. female,Established patient, here for evaluation of the following chief complaint(s):  Back Pain (Went to  yesterday and saw Chiropractor today )         ASSESSMENT/PLAN:  1. Sciatica of left side  -     methylPREDNISolone acetate (DEPO-MEDROL) injection 80 mg; 80 mg, IntraMUSCular, ONCE, 1 dose, On Mon 10/31/22 at [address]    Return if symptoms worsen or fail to improve. Continue stretching exercises and walking. PT for gait training, strength and stretching. Subjective   SUBJECTIVE/OBJECTIVE:  HPI  The patient presents for evaluation of left side back pain with radiation into the posterior thigh. Symptoms ongoing for a couple of weeks. She notes symptoms started after lifting some boxes and raking leaves. No new weakness or numbness and tingling. Review of Systems   Constitutional: Negative. HENT: Negative. Genitourinary: Negative. Musculoskeletal:  Positive for back pain. Negative for joint swelling. Objective   Physical Exam  Constitutional:       General: She is not in acute distress. Appearance: Normal appearance. She is normal weight. She is not ill-appearing. Cardiovascular:      Rate and Rhythm: Normal rate and regular rhythm. Pulses: Normal pulses. Heart sounds: Normal heart sounds. Pulmonary:      Effort: Pulmonary effort is normal.      Breath sounds: Normal breath sounds. Musculoskeletal:      Lumbar back: Tenderness present. No swelling, deformity or spasms. Decreased range of motion. Negative right straight leg raise test and negative left straight leg raise test.      Comments: Left SI region   Skin:     General: Skin is warm and dry. Capillary Refill: Capillary refill takes less than 2 seconds. Neurological:      General: No focal deficit present. Mental Status: She is alert and oriented to person, place, and time.                 An electronic signature was used to authenticate this note.     --Aleksandra Miramontes, APRN - CNP

## 2022-10-31 NOTE — PROGRESS NOTES
Medication(s) given during visit:    Administrations This Visit       methylPREDNISolone acetate (DEPO-MEDROL) injection 80 mg       Admin Date  10/31/2022  16:06 Action  Given Dose  80 mg Route  IntraMUSCular Site  Ventrogluteal Left  Administered By  Duane Ortega MA    Ordering Provider: SIDDHARTHA Mckee CNP    NDC: 7685-7444-66    Lot#: [id number]    : Mathis Koyanagi. Patient Supplied?: No                    Patient instructed to remain in clinic for 20 minutes after injection and was advised to report any adverse reaction to me immediately.

## 2022-11-01 ASSESSMENT — ENCOUNTER SYMPTOMS: BACK PAIN: 1

## 2022-11-05 ENCOUNTER — HOSPITAL ENCOUNTER (EMERGENCY)
Age: 77
Discharge: HOME | End: 2022-11-05
Payer: MEDICARE

## 2022-11-05 ENCOUNTER — APPOINTMENT (OUTPATIENT)
Dept: GENERAL RADIOLOGY | Age: 77
End: 2022-11-05
Payer: MEDICARE

## 2022-11-05 VITALS
TEMPERATURE: 98.3 F | RESPIRATION RATE: 18 BRPM | SYSTOLIC BLOOD PRESSURE: 131 MMHG | OXYGEN SATURATION: 97 % | HEART RATE: 63 BPM | DIASTOLIC BLOOD PRESSURE: 60 MMHG

## 2022-11-05 DIAGNOSIS — M54.42 ACUTE LEFT-SIDED BACK PAIN WITH SCIATICA: Primary | ICD-10-CM

## 2022-11-05 PROCEDURE — 96372 THER/PROPH/DIAG INJ SC/IM: CPT

## 2022-11-05 PROCEDURE — 72100 X-RAY EXAM L-S SPINE 2/3 VWS: CPT

## 2022-11-05 PROCEDURE — 99213 OFFICE O/P EST LOW 20 MIN: CPT

## 2022-11-05 PROCEDURE — 99214 OFFICE O/P EST MOD 30 MIN: CPT | Performed by: NURSE PRACTITIONER

## 2022-11-05 PROCEDURE — 6360000002 HC RX W HCPCS: Performed by: NURSE PRACTITIONER

## 2022-11-05 RX ORDER — PREDNISONE 20 MG/1
20 TABLET ORAL 2 TIMES DAILY
Qty: 10 TABLET | Refills: 0 | Status: SHIPPED | OUTPATIENT
Start: 2022-11-05 | End: 2022-11-10

## 2022-11-05 RX ORDER — KETOROLAC TROMETHAMINE 30 MG/ML
30 INJECTION, SOLUTION INTRAMUSCULAR; INTRAVENOUS ONCE
Status: COMPLETED | OUTPATIENT
Start: 2022-11-05 | End: 2022-11-05

## 2022-11-05 RX ORDER — LIDOCAINE 4 G/G
1 PATCH TOPICAL EVERY 24 HOURS
Qty: 30 PATCH | Refills: 0 | Status: SHIPPED | OUTPATIENT
Start: 2022-11-05 | End: 2022-11-16 | Stop reason: SDUPTHER

## 2022-11-05 RX ORDER — TIZANIDINE HYDROCHLORIDE 4 MG/1
4 CAPSULE, GELATIN COATED ORAL 3 TIMES DAILY PRN
Qty: 45 CAPSULE | Refills: 0 | Status: SHIPPED | OUTPATIENT
Start: 2022-11-05 | End: 2022-11-16 | Stop reason: SDUPTHER

## 2022-11-05 RX ORDER — TRAMADOL HYDROCHLORIDE 50 MG/1
50 TABLET ORAL EVERY 6 HOURS PRN
Qty: 12 TABLET | Refills: 0 | Status: SHIPPED | OUTPATIENT
Start: 2022-11-05 | End: 2022-11-08

## 2022-11-05 RX ADMIN — KETOROLAC TROMETHAMINE 30 MG: 30 INJECTION, SOLUTION INTRAMUSCULAR; INTRAVENOUS at 08:29

## 2022-11-05 ASSESSMENT — ENCOUNTER SYMPTOMS
NAUSEA: 0
COUGH: 0
ALLERGIC/IMMUNOLOGIC NEGATIVE: 1
BACK PAIN: 0
SORE THROAT: 0
RHINORRHEA: 0
EYE REDNESS: 0
CONSTIPATION: 0
DIARRHEA: 0
SHORTNESS OF BREATH: 0
ABDOMINAL PAIN: 0
VOMITING: 0
EYE PAIN: 0
EYE DISCHARGE: 0
TROUBLE SWALLOWING: 0
WHEEZING: 0

## 2022-11-05 ASSESSMENT — PAIN DESCRIPTION - ORIENTATION: ORIENTATION: LOWER

## 2022-11-05 ASSESSMENT — PAIN SCALES - GENERAL: PAINLEVEL_OUTOF10: 7

## 2022-11-05 ASSESSMENT — PAIN DESCRIPTION - PAIN TYPE: TYPE: ACUTE PAIN

## 2022-11-05 ASSESSMENT — PAIN DESCRIPTION - LOCATION: LOCATION: BACK

## 2022-11-05 ASSESSMENT — PAIN DESCRIPTION - FREQUENCY: FREQUENCY: CONTINUOUS

## 2022-11-05 ASSESSMENT — PAIN - FUNCTIONAL ASSESSMENT: PAIN_FUNCTIONAL_ASSESSMENT: 0-10

## 2022-11-05 ASSESSMENT — PAIN DESCRIPTION - DIRECTION: RADIATING_TOWARDS: LEFT LEG

## 2022-11-05 NOTE — ED PROVIDER NOTES
[facility]  Urgent Care Encounter      CHIEF COMPLAINT       Chief Complaint   Patient presents with    Back Pain       Nurses Notes reviewed and I agree except as noted in the HPI. HISTORY OF PRESENT ILLNESS   Nae Ramirez is a 69 y.o. female who presents complaint of ongoing acute low back pain with sciatica on the left side. Patient has been seen on October 30 in urgent care, October 31 and primary care. Patient has been prescribed NSAIDs, given a Depo-Medrol injection, orders for physical therapy. Patient continues to have severe pain with radiation down to her left leg, not sleeping well and is not managed with current medications. No imaging has been completed yet. REVIEW OF SYSTEMS     Review of Systems   Constitutional:  Negative for activity change, fatigue and fever. HENT:  Negative for congestion, ear pain, rhinorrhea, sore throat and trouble swallowing. Eyes:  Negative for pain, discharge and redness. Respiratory:  Negative for cough, shortness of breath and wheezing. Cardiovascular: Negative. Gastrointestinal:  Negative for abdominal pain, constipation, diarrhea, nausea and vomiting. Endocrine: Negative. Genitourinary:  Negative for dysuria, frequency and urgency. Musculoskeletal:  Negative for arthralgias, back pain and myalgias. Skin:  Negative for rash. Allergic/Immunologic: Negative. Neurological:  Negative for dizziness, tremors, weakness and headaches. Hematological: Negative. Psychiatric/Behavioral:  Negative for dysphoric mood and sleep disturbance. The patient is not nervous/anxious. PAST MEDICAL HISTORY   History reviewed. No pertinent past medical history. SURGICAL HISTORY     Patient  has no past surgical history on file.     CURRENT MEDICATIONS       Discharge Medication List as of 11/5/2022  9:11 AM        CONTINUE these medications which have NOT CHANGED    Details   tiZANidine (ZANAFLEX) 4 MG tablet take 1 tablet by mouth three times a day if needed muscle spasmHistorical Med      naproxen (NAPROSYN) 500 MG tablet Take 1 tablet by mouth 2 times daily as needed for Pain, Disp-60 tablet, R-0Normal      tiZANidine (ZANAFLEX) 4 MG capsule Take 1 capsule by mouth 3 times daily as needed for Muscle spasms, Disp-15 capsule, R-0Normal      terbinafine (LAMISIL) 250 MG tablet take 1 tablet by mouth once dailyHistorical Med      diclofenac sodium (VOLTAREN) 1 % GEL Apply 2 g topically 4 times daily as needed for Pain, Topical, 4 TIMES DAILY PRN Starting Fri 7/8/2022, Disp-200 g, R-5, Normal      sertraline (ZOLOFT) 50 MG tablet Take 1 tablet by mouth daily, Disp-90 tablet, R-3Normal             ALLERGIES     Patient is is allergic to demerol [meperidine hcl]. FAMILY HISTORY     Patient'sfamily history includes Breast Cancer in her sister; Heart Attack in her father; Stroke in her mother. SOCIAL HISTORY     Patient  reports that she has never smoked. She has never used smokeless tobacco. She reports current alcohol use. She reports that she does not use drugs. PHYSICAL EXAM     ED TRIAGE VITALS  BP: 131/60, Temp: 98.3 °F (36.8 °C), Heart Rate: 63, Resp: 18, SpO2: 97 %  Physical Exam  Vitals and nursing note reviewed. Constitutional:       General: She is not in acute distress. Appearance: She is well-developed. She is not ill-appearing or diaphoretic. HENT:      Right Ear: External ear normal.      Left Ear: External ear normal.      Nose: Nose normal.   Eyes:      General:         Right eye: No discharge. Left eye: No discharge. Conjunctiva/sclera: Conjunctivae normal.      Pupils: Pupils are equal, round, and reactive to light. Neck:      Vascular: No JVD. Cardiovascular:      Rate and Rhythm: Normal rate and regular rhythm. Pulmonary:      Effort: Pulmonary effort is normal. No respiratory distress. Musculoskeletal:         General: No tenderness or deformity. Normal range of motion. Cervical back: Normal range of motion. Skin:     General: Skin is warm and dry. Capillary Refill: Capillary refill takes less than 2 seconds. Coloration: Skin is not pale. Findings: No erythema or rash. Neurological:      Mental Status: She is alert and oriented to person, place, and time. Coordination: Coordination normal.   Psychiatric:         Behavior: Behavior normal.         Thought Content: Thought content normal.         Judgment: Judgment normal.       DIAGNOSTIC RESULTS   Labs:No results found for this visit on 11/05/22. IMAGING:  XR LUMBAR SPINE (2-3 VIEWS)   Final Result   Degenerative changes with no acute fracture or subluxation. **This report has been created using voice recognition software. It may contain minor errors which are inherent in voice recognition technology. **      Final report electronically signed by Dr Liane Mayorga on 11/5/2022 8:57 AM         URGENT CARE COURSE:     Vitals:    11/05/22 0812 11/05/22 0912   BP: (!) 154/78 131/60   Pulse: 78 63   Resp: 16 18   Temp: 98.3 °F (36.8 °C)    TempSrc: Temporal    SpO2: 98% 97%       Medications   ketorolac (TORADOL) injection 30 mg (30 mg IntraMUSCular Given 11/5/22 0829)     PROCEDURES:  None  FINAL IMPRESSION      1. Acute left-sided back pain with sciatica        DISPOSITION/PLAN   DISPOSITION Decision To Discharge 11/05/2022 08:59:03 AM    Lumbar x-ray shows no acute findings. Patient's Toradol injection in gave her pain relief while in the urgent care. Tramadol prescribed for a few days as the patient is not sleeping at all and cannot get comfortable in any position. No indication of DVT on exam.  Medications refilled the patient will follow-up with her PCP, and has physical therapy scheduled.     PATIENT REFERRED TO:  Florentino Richard MD  [address] [place] [address]  [phone]        DISCHARGE MEDICATIONS:  Discharge Medication List as of 11/5/2022  9:11 AM        START taking these medications    Details   lidocaine 4 % external patch Place 1 patch onto the skin every 24 hours Place 1 patch onto the skin daily 12 hours on, 12 hours off., TransDERmal, EVERY 24 HOURS Starting Sat 11/5/2022, Until Mon 12/5/2022, For 30 days, Disp-30 patch, R-0, Normal      traMADol (ULTRAM) 50 MG tablet Take 1 tablet by mouth every 6 hours as needed for Pain for up to 3 days. Intended supply: 3 days.  Take lowest dose possible to manage pain, Disp-12 tablet, R-0Normal      predniSONE (DELTASONE) 20 MG tablet Take 1 tablet by mouth 2 times daily for 5 days, Disp-10 tablet, R-0Normal           Discharge Medication List as of 11/5/2022  9:11 AM          SIDDHARTHA Duenas - SIDDHARTHA Maher CNP  11/05/22 5640

## 2022-11-09 ENCOUNTER — PATIENT MESSAGE (OUTPATIENT)
Dept: FAMILY MEDICINE CLINIC | Age: 77
End: 2022-11-09

## 2022-11-09 NOTE — TELEPHONE ENCOUNTER
It looks like patient has been working with Chip Beck NP for her back trouble. Patient appears to think she is having a MRI scheduled? Without neurological deficits (eg. leg weakness or foot drop) which have not been present on exam, insurance doesn't cover MRI as outpatient without 6 weeks of physical therapy. Please check with Deborah Brink on what has been discussed and then educate patient on process. Thank you.

## 2022-11-09 NOTE — TELEPHONE ENCOUNTER
From: El Gonzalez  To: Maday Voss  Sent: 11/9/2022 9:13 AM EST  Subject: MRI    I had to go to the walk- in clinic again last Saturday for pain in my lower back and down the back of my left leg. I can not lay down at all. I am still in pain and start therapy tomorrow. When will I be able to get a MRI scheduled? Thank you.

## 2022-11-10 ENCOUNTER — HOSPITAL ENCOUNTER (OUTPATIENT)
Dept: PHYSICAL THERAPY | Age: 77
Setting detail: THERAPIES SERIES
Discharge: HOME | End: 2022-11-10
Payer: MEDICARE

## 2022-11-10 PROCEDURE — 97110 THERAPEUTIC EXERCISES: CPT

## 2022-11-10 PROCEDURE — 97161 PT EVAL LOW COMPLEX 20 MIN: CPT

## 2022-11-10 NOTE — PROGRESS NOTES
** PLEASE SIGN, DATE AND TIME CERTIFICATION BELOW AND RETURN TO [facility] OUTPATIENT REHABILITATION (FAX #: [phone]). ATTEST/CO-SIGN IF ACCESSING VIA [employer]. THANK YOU.**    I certify that I have examined the patient below and determined that Physical Medicine and Rehabilitation service is necessary and that I approve the established plan of care for up to 90 days or as specifically noted.   Attestation, signature or co-signature of physician indicates approval of certification requirements.    ________________________ ____________ __________  Physician Signature   Date   Time  [address] [facility]  PHYSICAL THERAPY  [x] EVALUATION  [] DAILY NOTE (LAND) [] DAILY NOTE (AQUATIC ) [] PROGRESS NOTE [] DISCHARGE NOTE    [x] [address] [facility]   [] [place] [address]    [] [facility] [facility]   [] [facility]    Date: 11/10/2022  Patient Name:  Patricia Thacker  : 1945  MRN: [id number]  CSN: [id number]    Referring Practitioner SIDDHARTHA Dubose - *   Diagnosis Sciatica of left side [M54.32]    Treatment Diagnosis Low back pain, Left leg pain, lateral stenosis/facet disorder   Date of Evaluation 11/10/22    Additional Pertinent History Osteopenia      Functional Outcome Measure Used Modified Oswestry Low Back Pain   Functional Outcome Score 17/50 (11/10/22)       Insurance: Primary: Payor: Negin Ndiaye /  /  / ,   Secondary:    Authorization Information: OUTPATIENT BENEFITS:               DEDUCTIBLE: $NA                 OUT OF POCKET: $NA              INSURANCE PAYS AT: 100%               PATIENT RESPONSIBILITY AND/OR CO-PAY: NA  SECONDARY INSURANCE COMPANY:  NO      PRE CERTIFICATION REQUIRED: NO  INSURANCE THERAPY BENEFIT:  UNLIMITED VISITS BASED ON MEDICAL NECESSITY   AQUATIC THERAPY COVERED: YES  MODALITIES COVERED:  YES, YES MASSAGE  TELEHEALTH COVERED: NA  REFERENCE NUMBER: [id number]   Visit # 1, 1/10 for progress note   Visits Allowed: Unlimited based on medical necessity   Recertification Date: 6/97/39   Physician Follow-Up: None noted   Physician Orders:    History of Present Illness: Levy Byrd is a 69year old female with chief complaint of Low back pain and Left sided radicular symptoms. Patient reports problem began in October of 2022 due to overexertion with yard work. She has seen by PCP, chiropractor, and pain management which resulted in x-ray which revealed no fracture. Patient also had referral to therapy. Additional history involving this problem includes infrequent bouts of similar issues that was not long lasting. She also reports loss of  about one year ago. Patient feels relief with 5 day steroid and use of tramodol. She reports standing and upright sitting as most beneficial for her. Patient also uses hot pack. She notes worse pain with laying flat and twisting. They rate their pain generally as 4/10 with pain medications but 10/10 at worst. Patient has been limited with lifting with house chores, yard work, and ADLs due to their issue. They are seeking therapy services for pain relief and increased independence. SUBJECTIVE: See Above    Social/Functional History and Current Status:  Medications and Allergies have been reviewed and are listed on Medical History Questionnaire. Celine Killian lives alone in a single story home with 2 stairs and a handrail to enter. Task Previous Current   ADLs  Independent Modified Independent   IADL's Independent Modified Independent   Ambulation Independent Modified Independent   Transfers Independent Modified Independent   Recreation Independent Modified Independent   Community Integration Independent Modified Independent   Driving Active  Active    Work Retired  Retired     OBJECTIVE:    Pain: 4/10 in Left leg   Palpation Tight paraspinals on Left side from Thoracic region down to glute;  Tenderness through sacrum and piriformis   Observation    Posture        Range of Motion Limited trunk extension   Strength Mild weakness noted throughout Left LE (4+/5), likely associated due to pain   Coordination    Sensation    Bed Mobility Hesitant for some movements   Transfers    Ambulation    Stairs    Balance    Special Tests Positive JUAN; centralized pain with extension position but no radicular symptoms noted with standing flexion         TREATMENT   Precautions:    Pain:     X in shaded column indicates activity completed today   Modalities Parameters/  Location  Notes                     Manual Therapy Time/Technique  Notes                     Exercise/Intervention   Notes   Supine       TA activation + posterior pelvic tilt* 10x  X    Lower trunk rotation* 10x  X    Piriformis* 2x 30s  X    JUAN stretch* 2x 30s  X    Sciatic nerve glides* 10x  X                                         Specific Interventions Next Treatment: Centralize pain to back, core strengthening, modalities as needed    Activity/Treatment Tolerance:  [x]  Patient tolerated treatment well  []  Patient limited by fatigue  []  Patient limited by pain   []  Patient limited by medical complications  []  Other:     Assessment: Patient demonstrating fair overall mobility but reports ongoing radicular pain throughout her Left leg. Patient notes that her pain is worsened with extended postures and with laying flat. She also reports that most of her pain starts in her hip and radiates down to her calf or foot. Patient does have tenderness and muscle tightness present throughout her lumbar spine, particularly on the left side. She reports that she has had a massage in the past which targeted her piriformis muscle which gave her significant relief for about 24 hours before returning the second day. Patient reports all of her symptoms worsen with Left sided flexion or rotational movements but often alleviate with Right sided movements which is consistent with lateral stenosis or facet issues.  Patient would benefit from rehabilitation services to alleviate her symptoms, restore proper biomechanics, and return to prior level of independence. Body Structures/Functions/Activity Limitations: impaired motor control, impaired ROM, pain, and abnormal posture  Prognosis: good    GOALS:  Patient Goal: To reduce pain    Short Term Goals:   Time Frame: 4 weeks     -Patient will report decreased Left leg pain to less than or equal to 2/10 during therapy exercises in order to reduce use of modalities or pain medications.    -Patient will improve AROM of Left hamstring to neutral in 90-90 positions in order to complete functional movements with less difficulty or hesitation.   -Patient will MMT to 5/5 in Left hip in order to improve stability while standing.   -Patient will demonstrate improved postural strength/awareness as evidenced by abdominal bracing while completing log roll without cues. Long Term Goals:   Time Frame: 10 weeks     -Patient will report less than or equal to 1/10 pain in low back and left leg in order to improve quality of life.   -Patient will report improved quality of life as evidenced by Modified Oswestry score less than or equal to 10/50.   -Patient will have no increased pain with pushing, pulling, and lifting activities in order to improve functional movement patterns.   -Patient will demonstrate understanding of HEP upon discharge in order to maintain good mobility upon discharge. Patient Education:   [x]  HEP/Education Completed: Plan of Care, Goals, Posture and core strength, anatomy of low back  Medbridge Access Code:  []  No new Education completed  []  Reviewed Prior HEP      [x]  Patient verbalized and/or demonstrated understanding of education provided. []  Patient unable to verbalize and/or demonstrate understanding of education provided. Will continue education.   []  Barriers to learning:     PLAN:  Treatment Recommendations: Strengthening, Range of Motion, Manual Therapy - Soft Tissue Mobilization, Pain Management, Home Exercise Program, Patient Education, and Modalities    [x]  Plan of care initiated. Plan to see patient 1-2 times per week for 10 weeks to address the treatment planned outlined above.   []  Continue with current plan of care  []  Modify plan of care as follows:    []  Hold pending physician visit  []  Discharge    Time In 0801   Time Out 0903   Timed Code Minutes: 15 min   Total Treatment Time: 62 min       Electronically Signed by: Joseline Washington, PT

## 2022-11-11 ENCOUNTER — HOSPITAL ENCOUNTER (EMERGENCY)
Age: 77
Discharge: HOME | End: 2022-11-11
Payer: MEDICARE

## 2022-11-11 VITALS
OXYGEN SATURATION: 100 % | BODY MASS INDEX: 22.05 KG/M2 | WEIGHT: 145 LBS | DIASTOLIC BLOOD PRESSURE: 66 MMHG | HEART RATE: 64 BPM | RESPIRATION RATE: 22 BRPM | SYSTOLIC BLOOD PRESSURE: 159 MMHG

## 2022-11-11 DIAGNOSIS — M54.32 SCIATICA OF LEFT SIDE: Primary | ICD-10-CM

## 2022-11-11 PROCEDURE — 99212 OFFICE O/P EST SF 10 MIN: CPT | Performed by: NURSE PRACTITIONER

## 2022-11-11 PROCEDURE — 99213 OFFICE O/P EST LOW 20 MIN: CPT

## 2022-11-11 RX ORDER — ONDANSETRON 4 MG/1
4 TABLET, ORALLY DISINTEGRATING ORAL EVERY 8 HOURS PRN
Qty: 15 TABLET | Refills: 0 | Status: SHIPPED | OUTPATIENT
Start: 2022-11-11 | End: 2022-11-16

## 2022-11-11 RX ORDER — PREDNISONE 10 MG/1
TABLET ORAL
Qty: 14 TABLET | Refills: 0 | Status: SHIPPED | OUTPATIENT
Start: 2022-11-11 | End: 2022-12-01

## 2022-11-11 ASSESSMENT — ENCOUNTER SYMPTOMS
SHORTNESS OF BREATH: 0
NAUSEA: 1
CHEST TIGHTNESS: 0
COUGH: 0
CONSTIPATION: 0
VOMITING: 0
BLOOD IN STOOL: 0
DIARRHEA: 0

## 2022-11-11 ASSESSMENT — PAIN - FUNCTIONAL ASSESSMENT: PAIN_FUNCTIONAL_ASSESSMENT: 0-10

## 2022-11-11 ASSESSMENT — PAIN DESCRIPTION - FREQUENCY: FREQUENCY: CONTINUOUS

## 2022-11-11 ASSESSMENT — PAIN DESCRIPTION - LOCATION: LOCATION: LEG

## 2022-11-11 ASSESSMENT — PAIN SCALES - GENERAL: PAINLEVEL_OUTOF10: 10

## 2022-11-11 ASSESSMENT — PAIN DESCRIPTION - ORIENTATION: ORIENTATION: LEFT

## 2022-11-11 NOTE — ED TRIAGE NOTES
Pt presents to [facility] with c/o pain in lower left lumbar radiating down left leg. States she was given Tramadol, took last one Wed. Has taken Naproxen the past 2 days which is not helping. Has not slept all night due to spasms, worsening when laying down.

## 2022-11-11 NOTE — ED PROVIDER NOTES
[facility]       CHIEF COMPLAINT       Chief Complaint   Patient presents with    Leg Pain       Nurses Notes reviewed and I agree except as noted in the HPI. HISTORY OF PRESENT ILLNESS   Mikle Goodell is a 69 y.o. female who presents     Patient shares that for over a week she has had ongoing pain to left hip that shoots down entire left leg. Denies any recent falls or injuries. She shares that she did have a recent x-ray completed about a week ago that was found to be negative. She has been trying prescribed naproxen and Zanaflex, but does not feel they have been beneficial.  She shares that she has been prescribed tramadol in the past for her sciatic pain and feels that was the most effective. REVIEW OF SYSTEMS     Review of Systems   Constitutional:  Negative for chills, fatigue and fever. HENT:  Negative for congestion. Respiratory:  Negative for cough, chest tightness and shortness of breath. Cardiovascular:  Negative for chest pain, palpitations and leg swelling. Gastrointestinal:  Positive for nausea (Mild nausea with pain). Negative for blood in stool, constipation, diarrhea and vomiting. Musculoskeletal:  Positive for arthralgias (left leg) and myalgias (left leg). Negative for joint swelling, neck pain and neck stiffness. Skin:  Negative for rash. Neurological:  Negative for dizziness, light-headedness and headaches. PAST MEDICAL HISTORY   History reviewed. No pertinent past medical history. SURGICALHISTORY     Patient  has no past surgical history on file.     CURRENT MEDICATIONS       Discharge Medication List as of 11/11/2022  8:25 AM        CONTINUE these medications which have NOT CHANGED    Details   lidocaine 4 % external patch Place 1 patch onto the skin every 24 hours Place 1 patch onto the skin daily 12 hours on, 12 hours off., TransDERmal, EVERY 24 HOURS Starting Sat 11/5/2022, Until Mon 12/5/2022, For 30 days, Disp-30 patch, R-0, Normal      tiZANidine (ZANAFLEX) 4 MG capsule Take 1 capsule by mouth 3 times daily as needed for Muscle spasms, Disp-45 capsule, R-0Normal      naproxen (NAPROSYN) 500 MG tablet Take 1 tablet by mouth 2 times daily as needed for Pain, Disp-60 tablet, R-0Normal      terbinafine (LAMISIL) 250 MG tablet take 1 tablet by mouth once dailyHistorical Med      diclofenac sodium (VOLTAREN) 1 % GEL Apply 2 g topically 4 times daily as needed for Pain, Topical, 4 TIMES DAILY PRN Starting Fri 7/8/2022, Disp-200 g, R-5, Normal      sertraline (ZOLOFT) 50 MG tablet Take 1 tablet by mouth daily, Disp-90 tablet, R-3Normal             ALLERGIES     Patient is is allergic to demerol [meperidine hcl]. Patients   Immunization History   Administered Date(s) Administered    COVID-19, PFIZER PURPLE top, DILUTE for use, (age 15 y+), 30mcg/0.3mL 01/29/2021, 05/07/2021    Influenza, FLUAD, (age 72 y+), Adjuvanted, 0.5mL 10/14/2022       FAMILY HISTORY     Patient's family history includes Breast Cancer in her sister; Heart Attack in her father; Stroke in her mother. SOCIAL HISTORY     Patient  reports that she has never smoked. She has never used smokeless tobacco. She reports current alcohol use. She reports that she does not use drugs. PHYSICAL EXAM     ED TRIAGE VITALS  BP: (!) 159/66,  , Heart Rate: 64, Resp: 22, SpO2: 100 %,Estimated body mass index is 22.05 kg/m² as calculated from the following:    Height as of 10/31/22: 5' 8\" (1.727 m). Weight as of this encounter: 145 lb (65.8 kg). ,No LMP recorded. Patient is postmenopausal.    Physical Exam  Constitutional:       General: She is not in acute distress. Appearance: Normal appearance. She is normal weight. Cardiovascular:      Rate and Rhythm: Normal rate. Pulses: Normal pulses. Heart sounds: No murmur heard. No gallop. Pulmonary:      Effort: Pulmonary effort is normal. No respiratory distress.       Breath sounds: Normal breath sounds. No stridor. No wheezing, rhonchi or rales. Chest:      Chest wall: No tenderness. Musculoskeletal:         General: Tenderness (Left side hip) present. No swelling, deformity or signs of injury. Normal range of motion. Right lower leg: No edema. Left lower leg: No edema. Skin:     General: Skin is warm. Capillary Refill: Capillary refill takes less than 2 seconds. Neurological:      General: No focal deficit present. Mental Status: She is alert and oriented to person, place, and time. Motor: No weakness. Psychiatric:         Mood and Affect: Mood normal.         Behavior: Behavior normal.         Thought Content: Thought content normal.         Judgment: Judgment normal.       DIAGNOSTIC RESULTS     Labs:No results found for this visit on 11/11/22. IMAGING:    No orders to display     URGENT CARE COURSE:     Vitals:    11/11/22 0811   BP: (!) 159/66   Pulse: 64   Resp: 22   SpO2: 100%   Weight: 145 lb (65.8 kg)       Medications - No data to display         PROCEDURES:  None    FINAL IMPRESSION      1. Sciatica of left side          DISPOSITION/ PLAN   Patient we discharged home with prescription for prednisone tapering dose to take in place of naproxen. Discussed with patient that she should contact her PCP in regards to ordering MRI if there is been no significant findings with previous x-ray.         PATIENT REFERRED TO:  MD Malina Justice [facility] [address] [place] [address]      DISCHARGE MEDICATIONS:  Discharge Medication List as of 11/11/2022  8:25 AM        START taking these medications    Details   predniSONE (DELTASONE) 10 MG tablet Take 40 mg for days 1-2, 20 mg for day 3-4, 10 mg for days 5-6, Disp-14 tablet, R-0Normal      ondansetron (ZOFRAN ODT) 4 MG disintegrating tablet Take 1 tablet by mouth every 8 hours as needed for Nausea or Vomiting, Disp-15 tablet, R-0Normal             Discharge Medication List as of 11/11/2022  8:25 AM Discharge Medication List as of 11/11/2022  8:25 AM          SIDDHARTHA Morrison NP    (Please note that portions of this note were completed with a voice recognition program. Efforts were made to edit the dictations but occasionally words are mis-transcribed.)          SIDDHARTHA Kurtz NP  11/11/22 8632

## 2022-11-11 NOTE — TELEPHONE ENCOUNTER
Patient was is office today but was unable to be seen do to her morning visit to the Urgent Care. She has rescheduled appointment for Monday and was notified about [facility] walk in clinic.

## 2022-11-11 NOTE — DISCHARGE INSTRUCTIONS
May take muscle relaxer with tapering dose of prednisone, however do recommend stopping naproxen while taking prednisone. Contact PCP in regards to order for MRI.

## 2022-11-14 ENCOUNTER — OFFICE VISIT (OUTPATIENT)
Dept: FAMILY MEDICINE CLINIC | Age: 77
End: 2022-11-14
Payer: MEDICARE

## 2022-11-14 VITALS
TEMPERATURE: 97.2 F | BODY MASS INDEX: 23.46 KG/M2 | SYSTOLIC BLOOD PRESSURE: 128 MMHG | HEART RATE: 89 BPM | HEIGHT: 68 IN | OXYGEN SATURATION: 99 % | RESPIRATION RATE: 16 BRPM | DIASTOLIC BLOOD PRESSURE: 68 MMHG | WEIGHT: 154.8 LBS

## 2022-11-14 DIAGNOSIS — M54.32 LEFT SIDED SCIATICA: ICD-10-CM

## 2022-11-14 DIAGNOSIS — M79.605 PAIN OF LEFT LOWER EXTREMITY: Primary | ICD-10-CM

## 2022-11-14 PROCEDURE — 1123F ACP DISCUSS/DSCN MKR DOCD: CPT | Performed by: NURSE PRACTITIONER

## 2022-11-14 PROCEDURE — 99213 OFFICE O/P EST LOW 20 MIN: CPT | Performed by: NURSE PRACTITIONER

## 2022-11-14 RX ORDER — TIZANIDINE 2 MG/1
TABLET ORAL
COMMUNITY
Start: 2022-11-05 | End: 2022-11-14

## 2022-11-14 ASSESSMENT — ENCOUNTER SYMPTOMS
RESPIRATORY NEGATIVE: 1
GASTROINTESTINAL NEGATIVE: 1
BACK PAIN: 1

## 2022-11-14 NOTE — PROGRESS NOTES
Alistair Monzon (:  1945) is a 69 y.o. female,Established patient, here for evaluation of the following chief complaint(s):  Follow-up (From ED for back pain )         ASSESSMENT/PLAN:  1. Pain of left lower extremity  -     MRI LUMBAR SPINE WO CONTRAST; Future  2. Left sided sciatica  -     MRI LUMBAR SPINE WO CONTRAST; Future    Return if symptoms worsen or fail to improve. Will get MRI for possible referral to ortho spine vs pain management for injections. Continue PT. Continue Zanaflex and Medrol dose pack. Avoid activities that may increase pain, lifting and bending. Ice or heat as needed. Subjective   SUBJECTIVE/OBJECTIVE:  EWA Levy presents for evaluation of left leg pain and sciatic nerve pain. This has been an ongoing issue for over 2-3 weeks. She has went to the ER 2 times for excruciating left leg pain. She has tried prednisone injection, medrol dose pain, NSAIDs, muscle relaxers and physical therapy. She continues to have pain which is altering her activity and sleep. She denies new weakness and numbness and tingling, no bowel or bladder dysfunction. Review of Systems   Constitutional: Negative. HENT: Negative. Respiratory: Negative. Cardiovascular: Negative. Gastrointestinal: Negative. Musculoskeletal:  Positive for arthralgias and back pain. Skin: Negative. Neurological:  Negative for dizziness, weakness and numbness. Objective   Physical Exam  Constitutional:       General: She is not in acute distress. Appearance: Normal appearance. She is normal weight. She is not ill-appearing. Cardiovascular:      Rate and Rhythm: Normal rate. Pulses: Normal pulses. Pulmonary:      Effort: Pulmonary effort is normal.   Musculoskeletal:      Lumbar back: Tenderness present. No swelling, deformity or spasms.  Negative right straight leg raise test and negative left straight leg raise test.      Comments: Paraspinal TTP, quad and hamstring GMS 5/5. Negative alis's. DP/PT 2+   Skin:     General: Skin is warm and dry. Neurological:      General: No focal deficit present. Mental Status: She is alert and oriented to person, place, and time. Mental status is at baseline. An electronic signature was used to authenticate this note.     --Julissa Foss, APRN - CNP

## 2022-11-15 ENCOUNTER — HOSPITAL ENCOUNTER (OUTPATIENT)
Dept: PHYSICAL THERAPY | Age: 77
Setting detail: THERAPIES SERIES
Discharge: HOME | End: 2022-11-15
Payer: MEDICARE

## 2022-11-15 PROCEDURE — 97110 THERAPEUTIC EXERCISES: CPT

## 2022-11-15 PROCEDURE — 97530 THERAPEUTIC ACTIVITIES: CPT

## 2022-11-15 NOTE — PROGRESS NOTES
[address] [facility]  PHYSICAL THERAPY  [] EVALUATION  [x] DAILY NOTE (LAND) [] DAILY NOTE (AQUATIC ) [] PROGRESS NOTE [] DISCHARGE NOTE    [x] OUTPATIENT REHABILITATION CENTER [facility]   [] [place] [address]    [] [facility]   [] [place]    Date: 11/15/2022  Patient Name:  Holly Short  : 1945  MRN: [id number]  CSN: [id number]    Referring Practitioner SIDDHARTHA Gordon - *   Diagnosis Sciatica of left side [M54.32]    Treatment Diagnosis Low back pain, Left leg pain, lateral stenosis/facet disorder   Date of Evaluation 11/10/22    Additional Pertinent History Osteopenia      Functional Outcome Measure Used Modified Oswestry Low Back Pain   Functional Outcome Score 17/50 (11/10/22)       Insurance: Primary: Payor: Moraima Wu /  /  / ,   Secondary:    Authorization Information: OUTPATIENT BENEFITS:               DEDUCTIBLE: $NA                 OUT OF POCKET: $NA              INSURANCE PAYS AT: 100%               PATIENT RESPONSIBILITY AND/OR CO-PAY: NA  SECONDARY INSURANCE COMPANY:  NO      PRE CERTIFICATION REQUIRED: NO  INSURANCE THERAPY BENEFIT:  UNLIMITED VISITS BASED ON MEDICAL NECESSITY   AQUATIC THERAPY COVERED: YES  MODALITIES COVERED:  YES, YES MASSAGE  TELEHEALTH COVERED: NA  REFERENCE NUMBER: [id number]   Visit # 2, 2/10 for progress note   Visits Allowed: Unlimited based on medical necessity   Recertification Date:    Physician Follow-Up: None noted   Physician Orders:    History of Present Illness: Sharon Sauer is a 69year old female with chief complaint of Low back pain and Left sided radicular symptoms. Patient reports problem began in 2022 due to overexertion with yard work. She has seen by PCP, chiropractor, and pain management which resulted in x-ray which revealed no fracture. Patient also had referral to therapy. Additional history involving this problem includes infrequent bouts of similar issues that was not long lasting. She also reports loss of  about one year ago. Patient feels relief with 5 day steroid and use of tramodol. She reports standing and upright sitting as most beneficial for her. Patient also uses hot pack. She notes worse pain with laying flat and twisting. They rate their pain generally as 4/10 with pain medications but 10/10 at worst. Patient has been limited with lifting with house chores, yard work, and ADLs due to their issue. They are seeking therapy services for pain relief and increased independence. SUBJECTIVE: Patient reports reduced radicular symptoms through Left leg. She notes bilateral calves bothersome about 3 nights ago which as improved each night. She does not report it as muscle soreness and was able to get some relief with lidocaine patch/hot shower combo. Patient reports taking Aleve this morning for relief. OBJECTIVE:  TREATMENT   Precautions:    Pain:     X in shaded column indicates activity completed today   Modalities Parameters/  Location  Notes                     Manual Therapy Time/Technique  Notes                     Exercise/Intervention   Notes   Supine       TA activation + posterior pelvic tilt 10x5 sec  X    TA activation + march* 10x5 sec  X    TA activation + leg extension* 10x5 sec  X    Lower trunk rotation 10x  X    Piriformis 3x 30s  X    JUAN stretch 3x 30s  X    Sciatic nerve glides 10x  X                                Assessed gastroc for DVT, Achilles injury, etc.   X      Specific Interventions Next Treatment: Centralize pain to back, core strengthening, modalities as needed    Activity/Treatment Tolerance:  [x]  Patient tolerated treatment well  []  Patient limited by fatigue  []  Patient limited by pain   []  Patient limited by medical complications  []  Other:     ASSESSMENT: Patient does not report any low back pain or radicular symptoms at this time.  She has some mild discomfort through calves but did not have worsening symptoms with movement or palpation. Patient able to complete all supine exercises afterward without complaints of pain. GOALS:  Patient Goal: To reduce pain    Short Term Goals:   Time Frame: 4 weeks     -Patient will report decreased Left leg pain to less than or equal to 2/10 during therapy exercises in order to reduce use of modalities or pain medications.    -Patient will improve AROM of Left hamstring to neutral in 90-90 positions in order to complete functional movements with less difficulty or hesitation.   -Patient will MMT to 5/5 in Left hip in order to improve stability while standing.   -Patient will demonstrate improved postural strength/awareness as evidenced by abdominal bracing while completing log roll without cues. Long Term Goals:   Time Frame: 10 weeks     -Patient will report less than or equal to 1/10 pain in low back and left leg in order to improve quality of life.   -Patient will report improved quality of life as evidenced by Modified Oswestry score less than or equal to 10/50.   -Patient will have no increased pain with pushing, pulling, and lifting activities in order to improve functional movement patterns.   -Patient will demonstrate understanding of HEP upon discharge in order to maintain good mobility upon discharge. Patient Education:   [x]  HEP/Education Completed: Monitor for ongoing symptoms in gastroc, report to physician if worsens  Medbridge Access Code:  []  No new Education completed  []  Reviewed Prior HEP      [x]  Patient verbalized and/or demonstrated understanding of education provided. []  Patient unable to verbalize and/or demonstrate understanding of education provided. Will continue education. []  Barriers to learning:     PLAN:  Treatment Recommendations: Strengthening, Range of Motion, Manual Therapy - Soft Tissue Mobilization, Pain Management, Home Exercise Program, Patient Education, and Modalities    []  Plan of care initiated.   Plan to see patient 1-2 times per week for 10 weeks to address the treatment planned outlined above.   [x]  Continue with current plan of care  []  Modify plan of care as follows:    []  Hold pending physician visit  []  Discharge    Time In 1145   Time Out 1230   Timed Code Minutes: 45 min   Total Treatment Time: 45 min       Electronically Signed by: Herminio Tuttle PT

## 2022-11-16 ENCOUNTER — TELEPHONE (OUTPATIENT)
Dept: FAMILY MEDICINE CLINIC | Age: 77
End: 2022-11-16

## 2022-11-16 ENCOUNTER — OFFICE VISIT (OUTPATIENT)
Dept: FAMILY MEDICINE CLINIC | Age: 77
End: 2022-11-16
Payer: MEDICARE

## 2022-11-16 VITALS
OXYGEN SATURATION: 99 % | WEIGHT: 156 LBS | HEART RATE: 66 BPM | DIASTOLIC BLOOD PRESSURE: 76 MMHG | SYSTOLIC BLOOD PRESSURE: 138 MMHG | HEIGHT: 68 IN | BODY MASS INDEX: 23.64 KG/M2 | RESPIRATION RATE: 16 BRPM

## 2022-11-16 DIAGNOSIS — M54.16 LUMBAR RADICULOPATHY: Primary | ICD-10-CM

## 2022-11-16 PROCEDURE — 1123F ACP DISCUSS/DSCN MKR DOCD: CPT | Performed by: NURSE PRACTITIONER

## 2022-11-16 PROCEDURE — 99213 OFFICE O/P EST LOW 20 MIN: CPT | Performed by: NURSE PRACTITIONER

## 2022-11-16 RX ORDER — TRAMADOL HYDROCHLORIDE 50 MG/1
50 TABLET ORAL EVERY 6 HOURS PRN
Qty: 28 TABLET | Refills: 0 | Status: SHIPPED | OUTPATIENT
Start: 2022-11-16 | End: 2022-11-23

## 2022-11-16 RX ORDER — LIDOCAINE 4 G/G
1 PATCH TOPICAL EVERY 24 HOURS
Qty: 30 PATCH | Refills: 0 | Status: SHIPPED | OUTPATIENT
Start: 2022-11-16 | End: 2022-12-16

## 2022-11-16 RX ORDER — TIZANIDINE HYDROCHLORIDE 4 MG/1
4 CAPSULE, GELATIN COATED ORAL 3 TIMES DAILY PRN
Qty: 45 CAPSULE | Refills: 0 | Status: SHIPPED | OUTPATIENT
Start: 2022-11-16 | End: 2022-12-01 | Stop reason: ALTCHOICE

## 2022-11-16 ASSESSMENT — ENCOUNTER SYMPTOMS
RESPIRATORY NEGATIVE: 1
GASTROINTESTINAL NEGATIVE: 1
BACK PAIN: 1

## 2022-11-16 NOTE — PROGRESS NOTES
Ashley Ball (:  1945) is a 69 y.o. female,Established patient, here for evaluation of the following chief complaint(s):  Leg Pain (Bilateral--hip down)         ASSESSMENT/PLAN:  1. Lumbar radiculopathy  -     traMADol (ULTRAM) 50 MG tablet; Take 1 tablet by mouth every 6 hours as needed for Pain for up to 7 days. , Disp-28 tablet, R-0Normal  -     CT LUMBAR SPINE WO CONTRAST; Future    Return if symptoms worsen or fail to improve. She is scheduled for an LMRI on 22. PT yesterday. She has been seen in ER 3 times since 10/30 and here in the office 4 times for the left leg pain. Will go ahead and order a stat CT scan. Otherwise will give her a short supple of Tramadol to get her to her MRI. Will have her hold on PT until after the MRI. Subjective   SUBJECTIVE/OBJECTIVE:  EWA Mendoza returns for evaluation of worsening left leg pain. She has taken all of the steroids and has been using the muscle relaxants as prescribed. She had her first PT appointment yesterday and developed 10/10 radiating lumbar spine pain after. No bowel or bladder incontinence and no saddle anesthesia. Review of Systems   Constitutional: Negative. HENT: Negative. Respiratory: Negative. Cardiovascular: Negative. Gastrointestinal: Negative. Genitourinary: Negative. Musculoskeletal:  Positive for back pain. Objective   Physical Exam  Constitutional:       General: She is not in acute distress. Appearance: Normal appearance. She is normal weight. She is not ill-appearing or toxic-appearing. Cardiovascular:      Rate and Rhythm: Normal rate and regular rhythm. Pulses: Normal pulses. Heart sounds: Normal heart sounds. Pulmonary:      Effort: Pulmonary effort is normal.      Breath sounds: Normal breath sounds. Musculoskeletal:         General: No deformity. Lumbar back: Tenderness present. Decreased range of motion.  Positive left straight leg raise test. Negative right straight leg raise test.      Right lower leg: No edema. Left lower leg: No edema. Skin:     General: Skin is warm. Capillary Refill: Capillary refill takes less than 2 seconds. Neurological:      Mental Status: She is alert. An electronic signature was used to authenticate this note.     --SIDDHARTHA Hurt - CNP

## 2022-11-17 ENCOUNTER — HOSPITAL ENCOUNTER (OUTPATIENT)
Dept: PHYSICAL THERAPY | Age: 77
Setting detail: THERAPIES SERIES
Discharge: HOME | End: 2022-11-17
Payer: MEDICARE

## 2022-11-17 PROCEDURE — 97110 THERAPEUTIC EXERCISES: CPT

## 2022-11-17 NOTE — PROGRESS NOTES
[address] [facility]  PHYSICAL THERAPY  [] EVALUATION  [x] DAILY NOTE (LAND) [] DAILY NOTE (AQUATIC ) [] PROGRESS NOTE [] DISCHARGE NOTE    [x] OUTPATIENT REHABILITATION CENTER [facility]   [] [place] [address]    [] [facility]   [] [facility]    Date: 2022  Patient Name:  Graham Chisholm  : 1945  MRN: [id number]  CSN: [id number]    Referring Practitioner SIDDHARTHA Woodard - *   Diagnosis Sciatica of left side [M54.32]    Treatment Diagnosis Low back pain, Left leg pain, lateral stenosis/facet disorder   Date of Evaluation 11/10/22    Additional Pertinent History Osteopenia      Functional Outcome Measure Used Modified Oswestry Low Back Pain   Functional Outcome Score 17/50 (11/10/22)       Insurance: Primary: Payor: Beba Palma /  /  / ,   Secondary:    Authorization Information: OUTPATIENT BENEFITS:               DEDUCTIBLE: $NA                 OUT OF POCKET: $NA              INSURANCE PAYS AT: 100%               PATIENT RESPONSIBILITY AND/OR CO-PAY: NA  SECONDARY INSURANCE COMPANY:  NO      PRE CERTIFICATION REQUIRED: NO  INSURANCE THERAPY BENEFIT:  UNLIMITED VISITS BASED ON MEDICAL NECESSITY   AQUATIC THERAPY COVERED: YES  MODALITIES COVERED:  YES, YES MASSAGE  TELEHEALTH COVERED: NA  REFERENCE NUMBER: [id number]   Visit # 3, 3/10 for progress note   Visits Allowed: Unlimited based on medical necessity   Recertification Date:    Physician Follow-Up: None noted   Physician Orders:    History of Present Illness: Dequan Galvin is a 69year old female with chief complaint of Low back pain and Left sided radicular symptoms. Patient reports problem began in 2022 due to overexertion with yard work. She has seen by PCP, chiropractor, and pain management which resulted in x-ray which revealed no fracture. Patient also had referral to therapy. Additional history involving this problem includes infrequent bouts of similar issues that was not long lasting. She also reports loss of  about one year ago. Patient feels relief with 5 day steroid and use of tramodol. She reports standing and upright sitting as most beneficial for her. Patient also uses hot pack. She notes worse pain with laying flat and twisting. They rate their pain generally as 4/10 with pain medications but 10/10 at worst. Patient has been limited with lifting with house chores, yard work, and ADLs due to their issue. They are seeking therapy services for pain relief and increased independence. SUBJECTIVE: Patient reports that her pain is really bad today in both legs. She states that she is using a heating pad in order to sleep at night and taking a lot of hot showers. She states that she is very active at home and does not have much pain when she is being active; however, later in the day the pain does increase. She reports a dull pain in both legs at night that is keeping her awake at night.      OBJECTIVE:  TREATMENT   Precautions:    Pain: 7/10 bilateral legs/calves    X in shaded column indicates activity completed today   Modalities Parameters/  Location  Notes   MHP with supine TE 10 min x                Manual Therapy Time/Technique  Notes                     Exercise/Intervention   Notes   Supine       TA activation + posterior pelvic tilt 15*x5 sec  X    TA activation + march 10x5 sec  X    TA activation + leg extension 10x5 sec  X    TA activation + bridging * 10x5 sec  X     Lower trunk rotation 10x  X    Piriformis 10x 10s*  X     JUAN stretch 3x 30s  X     Sciatic nerve glides 10x  X     Hamstring stretches with strap* 10x10 sec  X     Seated piriformis stretch* 3x30 sec  X            Nustep S8A12 5 min Level 1 X            Assessed gastroc for DVT, Achilles injury, etc.   X      Specific Interventions Next Treatment: Centralize pain to back, core strengthening, modalities as needed    Activity/Treatment Tolerance:  [x]  Patient tolerated treatment well  []  Patient limited by fatigue  []  Patient limited by pain   []  Patient limited by medical complications  []  Other:     ASSESSMENT: Patient progressed as noted above. Utilized MHP with supine therex secondary to high pain levels today. Patient tolerated progressions well without increased pain levels. GOALS:  Patient Goal: To reduce pain    Short Term Goals:   Time Frame: 4 weeks     -Patient will report decreased Left leg pain to less than or equal to 2/10 during therapy exercises in order to reduce use of modalities or pain medications.    -Patient will improve AROM of Left hamstring to neutral in 90-90 positions in order to complete functional movements with less difficulty or hesitation.   -Patient will MMT to 5/5 in Left hip in order to improve stability while standing.   -Patient will demonstrate improved postural strength/awareness as evidenced by abdominal bracing while completing log roll without cues. Long Term Goals:   Time Frame: 10 weeks     -Patient will report less than or equal to 1/10 pain in low back and left leg in order to improve quality of life.   -Patient will report improved quality of life as evidenced by Modified Oswestry score less than or equal to 10/50.   -Patient will have no increased pain with pushing, pulling, and lifting activities in order to improve functional movement patterns.   -Patient will demonstrate understanding of HEP upon discharge in order to maintain good mobility upon discharge. Patient Education:   [x]  HEP/Education Completed: Monitor for ongoing symptoms in gastroc, report to physician if worsens  Medbridge Access Code:  []  No new Education completed  []  Reviewed Prior HEP      [x]  Patient verbalized and/or demonstrated understanding of education provided. []  Patient unable to verbalize and/or demonstrate understanding of education provided. Will continue education.   []  Barriers to learning:     PLAN:  Treatment Recommendations: Strengthening, Range of Motion, Manual Therapy - Soft Tissue Mobilization, Pain Management, Home Exercise Program, Patient Education, and Modalities    []  Plan of care initiated. Plan to see patient 1-2 times per week for 10 weeks to address the treatment planned outlined above.   [x]  Continue with current plan of care  []  Modify plan of care as follows:    []  Hold pending physician visit  []  Discharge    Time In 0955   Time Out 1035   Timed Code Minutes: 40min   Total Treatment Time: 40 min       Electronically Signed by: Marjan Sena PT

## 2022-11-21 ENCOUNTER — TELEPHONE (OUTPATIENT)
Dept: FAMILY MEDICINE CLINIC | Age: 77
End: 2022-11-21

## 2022-11-21 NOTE — TELEPHONE ENCOUNTER
Per [facility], the CT Lumbar ordered for this patient is pending denial for a peer to peer as the clinical information does not support medical necessity for the ordered test. All available clinical has been submitted at this time. The denial reasoning has been uploaded into the media tab for you review. Peer to peer available  # [phone]  Case# [id number]    Please advise. Patient is scheduled tomorrow.

## 2022-11-22 ENCOUNTER — HOSPITAL ENCOUNTER (OUTPATIENT)
Dept: PHYSICAL THERAPY | Age: 77
Setting detail: THERAPIES SERIES
Discharge: HOME | End: 2022-11-22
Payer: MEDICARE

## 2022-11-22 PROCEDURE — 97110 THERAPEUTIC EXERCISES: CPT

## 2022-11-22 NOTE — PROGRESS NOTES
[address] [facility]  PHYSICAL THERAPY  [] EVALUATION  [x] DAILY NOTE (LAND) [] DAILY NOTE (AQUATIC ) [] PROGRESS NOTE [] DISCHARGE NOTE    [x] OUTPATIENT REHABILITATION CENTER - [place]   [] [place] [address]    [] [facility]   [] Sheyla Wagner    Date: 2022  Patient Name:  Naomi Mays  : 1945  MRN: [id number]  CSN: [id number]    Referring Practitioner SIDDHARTHA Moore - *   Diagnosis Sciatica of left side [M54.32]    Treatment Diagnosis Low back pain, Left leg pain, lateral stenosis/facet disorder   Date of Evaluation 11/10/22    Additional Pertinent History Osteopenia      Functional Outcome Measure Used Modified Oswestry Low Back Pain   Functional Outcome Score 17/50 (11/10/22)       Insurance: Primary: Payor: Dave Puente /  /  / ,   Secondary:    Authorization Information: OUTPATIENT BENEFITS:               DEDUCTIBLE: $NA                 OUT OF POCKET: $NA              INSURANCE PAYS AT: 100%               PATIENT RESPONSIBILITY AND/OR CO-PAY: NA  SECONDARY INSURANCE COMPANY:  NO      PRE CERTIFICATION REQUIRED: NO  INSURANCE THERAPY BENEFIT:  UNLIMITED VISITS BASED ON MEDICAL NECESSITY   AQUATIC THERAPY COVERED: YES  MODALITIES COVERED:  YES, YES MASSAGE  TELEHEALTH COVERED: NA  REFERENCE NUMBER: [id number]   Visit # 4, 4/10 for progress note   Visits Allowed: Unlimited based on medical necessity   Recertification Date: 3/23/39   Physician Follow-Up: None noted; MRI 22   Physician Orders:    History of Present Illness: Shiv Dumont is a 69year old female with chief complaint of Low back pain and Left sided radicular symptoms. Patient reports problem began in 2022 due to overexertion with yard work. She has seen by PCP, chiropractor, and pain management which resulted in x-ray which revealed no fracture. Patient also had referral to therapy.  Additional history involving this problem includes infrequent bouts of similar issues that was not long lasting. She also reports loss of  about one year ago. Patient feels relief with 5 day steroid and use of tramodol. She reports standing and upright sitting as most beneficial for her. Patient also uses hot pack. She notes worse pain with laying flat and twisting. They rate their pain generally as 4/10 with pain medications but 10/10 at worst. Patient has been limited with lifting with house chores, yard work, and ADLs due to their issue. They are seeking therapy services for pain relief and increased independence. SUBJECTIVE: Patient reports she woke up with pain at 3am, so did her exercises and used heat. Pt took Tramadol at 6am and it doesn't seem to be touching pain. Pain usually occurs around 7:30-8pm at night. Pt did grocery shop yesterday and is cautious with lifting, bending and carrying less bags of groceries into house. Pt c/o cramping like sensation in both calves. Pt notes a lesion/blister on top of buttock crease that is irritated by heat.      OBJECTIVE:  TREATMENT   Precautions:    Pain: 7/10 bilateral calves    X in shaded column indicates activity completed today   Modalities Parameters/  Location  Notes   MHP with supine TE 10 min x                Manual Therapy Time/Technique  Notes                     Exercise/Intervention   Notes   Supine       TA activation + posterior pelvic tilt 15*x5 sec      TA activation + march 10x5 sec      TA activation + leg extension 10x5 sec      TA activation + bridging  10x5 sec       Lower trunk rotation 10x      Piriformis 10x 10s      JUAN stretch 3x 30s       Sciatic nerve glides 10x       Hamstring stretches with strap 10x10 sec       Seated piriformis stretch 3x30 sec              Prone lying* 4 min  x No change in calves   Prone prop* 2x4 min  x Decreased calve pain   Prone press up 10  x Partial range, no change in calves, c/o right lumbar pain   Prone TA activation* 10x5 sec  x    Prone glut set* 10x5 sec  x    Prone hip IR/ER* 10x5 sec x                  Nustep S8A11 5 min Level 2* X            Assessed gastroc for DVT, Achilles injury, etc.         Specific Interventions Next Treatment: Centralize pain to back, core strengthening, modalities as needed    Activity/Treatment Tolerance:  [x]  Patient tolerated treatment well  []  Patient limited by fatigue  []  Patient limited by pain   []  Patient limited by medical complications  []  Other:     ASSESSMENT: Patient responded well to extension based program. B calf pain reduced in prone prop, slightly increased with prone stabilization exercises but reduced again with prone prop. GOALS:  Patient Goal: To reduce pain    Short Term Goals:   Time Frame: 4 weeks     -Patient will report decreased Left leg pain to less than or equal to 2/10 during therapy exercises in order to reduce use of modalities or pain medications.    -Patient will improve AROM of Left hamstring to neutral in 90-90 positions in order to complete functional movements with less difficulty or hesitation.   -Patient will MMT to 5/5 in Left hip in order to improve stability while standing.   -Patient will demonstrate improved postural strength/awareness as evidenced by abdominal bracing while completing log roll without cues. Long Term Goals:   Time Frame: 10 weeks     -Patient will report less than or equal to 1/10 pain in low back and left leg in order to improve quality of life.   -Patient will report improved quality of life as evidenced by Modified Oswestry score less than or equal to 10/50.   -Patient will have no increased pain with pushing, pulling, and lifting activities in order to improve functional movement patterns.   -Patient will demonstrate understanding of HEP upon discharge in order to maintain good mobility upon discharge.          Patient Education:   [x]  HEP/Education Completed: prone exercises, continue JUAN stretch, hold all others, hold heat/patches over central sacrum  Medbridge Access Code:  []  No new Education completed  []  Reviewed Prior HEP      [x]  Patient verbalized and/or demonstrated understanding of education provided. []  Patient unable to verbalize and/or demonstrate understanding of education provided. Will continue education. []  Barriers to learning:     PLAN:  Treatment Recommendations: Strengthening, Range of Motion, Manual Therapy - Soft Tissue Mobilization, Pain Management, Home Exercise Program, Patient Education, and Modalities    []  Plan of care initiated. Plan to see patient 1-2 times per week for 10 weeks to address the treatment planned outlined above.   [x]  Continue with current plan of care  []  Modify plan of care as follows:    []  Hold pending physician visit  []  Discharge    Time In 0915   Time Out 0955   Timed Code Minutes: 40 min   Total Treatment Time: 40 min       Electronically Signed by: Adelita Fang, PT

## 2022-11-29 ENCOUNTER — HOSPITAL ENCOUNTER (OUTPATIENT)
Dept: MRI IMAGING | Age: 77
Discharge: HOME | End: 2022-11-29
Payer: MEDICARE

## 2022-11-29 ENCOUNTER — APPOINTMENT (OUTPATIENT)
Dept: PHYSICAL THERAPY | Age: 77
End: 2022-11-29
Payer: MEDICARE

## 2022-11-29 ENCOUNTER — TELEPHONE (OUTPATIENT)
Dept: FAMILY MEDICINE CLINIC | Age: 77
End: 2022-11-29

## 2022-11-29 DIAGNOSIS — M79.605 PAIN OF LEFT LOWER EXTREMITY: ICD-10-CM

## 2022-11-29 DIAGNOSIS — M54.32 LEFT SIDED SCIATICA: ICD-10-CM

## 2022-11-29 PROCEDURE — 72148 MRI LUMBAR SPINE W/O DYE: CPT

## 2022-11-29 NOTE — TELEPHONE ENCOUNTER
----- Message from Javi Moses sent at 11/29/2022 11:51 AM EST -----  Subject: Results Request    QUESTIONS  Results: MRI LUMBAR ; Ordered by: Pepe Fuentes Performed: 2022-11-29  ---------------------------------------------------------------------------  --------------  Brittany Frey XPVF    [phone]; OK to leave message on voicemail  ---------------------------------------------------------------------------  --------------

## 2022-11-29 NOTE — TELEPHONE ENCOUNTER
Patient got MRI done today at 11 am, results are not in.  Informed patient that we will call her w results when ready or discussed at upcoming appt

## 2022-11-30 ENCOUNTER — TELEPHONE (OUTPATIENT)
Dept: FAMILY MEDICINE CLINIC | Age: 77
End: 2022-11-30

## 2022-11-30 NOTE — TELEPHONE ENCOUNTER
Patient informed and verbalized understanding. Patient will discuss further at appt.  12/1/22 with Gisselle Du NP for referral.

## 2022-11-30 NOTE — TELEPHONE ENCOUNTER
----- Message from SIDDHARTHA Disla CNP sent at 11/30/2022  8:25 AM EST -----  Please let the patient know her MRI shows multi-level arthritic changes with mild spinal canal stenosis. Would recommend she see a spine specialist for further recommendations.

## 2022-12-01 ENCOUNTER — OFFICE VISIT (OUTPATIENT)
Dept: FAMILY MEDICINE CLINIC | Age: 77
End: 2022-12-01
Payer: MEDICARE

## 2022-12-01 VITALS
TEMPERATURE: 97.7 F | RESPIRATION RATE: 16 BRPM | HEIGHT: 68 IN | SYSTOLIC BLOOD PRESSURE: 136 MMHG | BODY MASS INDEX: 23.46 KG/M2 | WEIGHT: 154.8 LBS | DIASTOLIC BLOOD PRESSURE: 82 MMHG | HEART RATE: 76 BPM

## 2022-12-01 DIAGNOSIS — M54.32 LEFT SIDED SCIATICA: ICD-10-CM

## 2022-12-01 DIAGNOSIS — M54.16 LUMBAR RADICULOPATHY: Primary | ICD-10-CM

## 2022-12-01 DIAGNOSIS — G25.81 RESTLESS LEG SYNDROME: ICD-10-CM

## 2022-12-01 PROCEDURE — 99214 OFFICE O/P EST MOD 30 MIN: CPT | Performed by: NURSE PRACTITIONER

## 2022-12-01 PROCEDURE — 1123F ACP DISCUSS/DSCN MKR DOCD: CPT | Performed by: NURSE PRACTITIONER

## 2022-12-01 RX ORDER — TRAMADOL HYDROCHLORIDE 50 MG/1
50 TABLET ORAL EVERY 8 HOURS PRN
Qty: 21 TABLET | Refills: 0 | Status: SHIPPED | OUTPATIENT
Start: 2022-12-01 | End: 2022-12-08

## 2022-12-01 RX ORDER — NAPROXEN 500 MG/1
500 TABLET ORAL 2 TIMES DAILY PRN
Qty: 60 TABLET | Refills: 0 | Status: SHIPPED | OUTPATIENT
Start: 2022-12-01

## 2022-12-01 RX ORDER — CYCLOBENZAPRINE HCL 10 MG
10 TABLET ORAL 3 TIMES DAILY PRN
Qty: 30 TABLET | Refills: 0 | Status: SHIPPED | OUTPATIENT
Start: 2022-12-01 | End: 2022-12-11

## 2022-12-01 ASSESSMENT — ENCOUNTER SYMPTOMS: BACK PAIN: 1

## 2022-12-01 NOTE — PROGRESS NOTES
Radha Aldridge (:  1945) is a 69 y.o. female,Established patient, here for evaluation of the following chief complaint(s):  Follow-up (Back pain) and Head Congestion (Patient states that she has been experiencing head congestion for the past 2 months. She has been taking OTC Pseudoephed and Flonase)         ASSESSMENT/PLAN:  1. Lumbar radiculopathy  -     [address]  -     traMADol (ULTRAM) 50 MG tablet; Take 1 tablet by mouth every 8 hours as needed for Pain for up to 7 days. Intended supply: 7 days. Take lowest dose possible to manage pain, Disp-21 tablet, R-0Normal  -     naproxen (NAPROSYN) 500 MG tablet; Take 1 tablet by mouth 2 times daily as needed for Pain, Disp-60 tablet, R-0Normal  -     cyclobenzaprine (FLEXERIL) 10 MG tablet; Take 1 tablet by mouth 3 times daily as needed for Muscle spasms, Disp-30 tablet, R-0Normal  2. Left sided sciatica  -     [address]  -     traMADol (ULTRAM) 50 MG tablet; Take 1 tablet by mouth every 8 hours as needed for Pain for up to 7 days. Intended supply: 7 days. Take lowest dose possible to manage pain, Disp-21 tablet, R-0Normal  -     naproxen (NAPROSYN) 500 MG tablet; Take 1 tablet by mouth 2 times daily as needed for Pain, Disp-60 tablet, R-0Normal  -     cyclobenzaprine (FLEXERIL) 10 MG tablet; Take 1 tablet by mouth 3 times daily as needed for Muscle spasms, Disp-30 tablet, R-0Normal  3. Restless leg syndrome      Return if symptoms worsen or fail to improve.     1,2,3 uncontrolled pain symptoms. MRI reviewed with the patient. We discussed treatment options including pain management referral for injections, medications to help with RLS and sleep, spine surgery referral to discuss if there are any surgical options. She would like to proceed with pain management referral and hold off on medications of RLS and spine referral.     Discussed her medications.  She notes she is taking 1.5 Naprosyn tablets and 1.5 Flexeril tablets at times when her pain is bad and she is having issues with sleep. She is currently participating in a PT program for strengthening and modalities as needed. Recommend she hold off on this until after her pain management appointment. Will give her a short supply of Tramadol until she can get into pain management. She is hoping for injections to help with the back pain and leg cramping. Subjective   SUBJECTIVE/OBJECTIVE:  HPI FU for the above noted complaints. She notes having more difficulty with bilateral leg cramping. She is not able to sleep at night 2/2 to the leg cramping. She notes the medications are somewhat helpful and she denies SE. Review of Systems   Musculoskeletal:  Positive for arthralgias, back pain and myalgias. Objective   Physical Exam  Constitutional:       General: She is not in acute distress. Appearance: Normal appearance. She is normal weight. She is not ill-appearing. Pulmonary:      Effort: Pulmonary effort is normal.      Breath sounds: No wheezing or rhonchi. Skin:     Capillary Refill: Capillary refill takes less than 2 seconds. Neurological:      Mental Status: She is alert. Coordination: Coordination normal.      Gait: Gait normal.                An electronic signature was used to authenticate this note.     --Jessica Sandoval, SIDDHARTHA - CNP

## 2022-12-02 ENCOUNTER — HOSPITAL ENCOUNTER (OUTPATIENT)
Dept: PHYSICAL THERAPY | Age: 77
Setting detail: THERAPIES SERIES
End: 2022-12-02
Payer: MEDICARE

## 2022-12-06 ENCOUNTER — APPOINTMENT (OUTPATIENT)
Dept: PHYSICAL THERAPY | Age: 77
End: 2022-12-06
Payer: MEDICARE

## 2022-12-08 ENCOUNTER — APPOINTMENT (OUTPATIENT)
Dept: PHYSICAL THERAPY | Age: 77
End: 2022-12-08
Payer: MEDICARE

## 2022-12-10 ENCOUNTER — HOSPITAL ENCOUNTER (EMERGENCY)
Age: 77
Discharge: HOME | End: 2022-12-10
Payer: MEDICARE

## 2022-12-10 VITALS
HEIGHT: 68 IN | HEART RATE: 88 BPM | DIASTOLIC BLOOD PRESSURE: 74 MMHG | WEIGHT: 150 LBS | SYSTOLIC BLOOD PRESSURE: 150 MMHG | TEMPERATURE: 97.2 F | OXYGEN SATURATION: 99 % | RESPIRATION RATE: 16 BRPM | BODY MASS INDEX: 22.73 KG/M2

## 2022-12-10 DIAGNOSIS — M54.16 SUBACUTE LUMBAR RADICULOPATHY: Primary | ICD-10-CM

## 2022-12-10 PROCEDURE — 99213 OFFICE O/P EST LOW 20 MIN: CPT

## 2022-12-10 PROCEDURE — 6360000002 HC RX W HCPCS: Performed by: EMERGENCY MEDICINE

## 2022-12-10 PROCEDURE — 99213 OFFICE O/P EST LOW 20 MIN: CPT | Performed by: EMERGENCY MEDICINE

## 2022-12-10 PROCEDURE — 96372 THER/PROPH/DIAG INJ SC/IM: CPT

## 2022-12-10 RX ORDER — TRAMADOL HYDROCHLORIDE 50 MG/1
50 TABLET ORAL EVERY 6 HOURS PRN
COMMUNITY
End: 2022-12-12

## 2022-12-10 RX ORDER — METHYLPREDNISOLONE ACETATE 80 MG/ML
80 INJECTION, SUSPENSION INTRA-ARTICULAR; INTRALESIONAL; INTRAMUSCULAR; SOFT TISSUE ONCE
Status: COMPLETED | OUTPATIENT
Start: 2022-12-10 | End: 2022-12-10

## 2022-12-10 RX ADMIN — METHYLPREDNISOLONE ACETATE 80 MG: 80 INJECTION, SUSPENSION INTRA-ARTICULAR; INTRALESIONAL; INTRAMUSCULAR; SOFT TISSUE at 08:28

## 2022-12-10 ASSESSMENT — ENCOUNTER SYMPTOMS
ABDOMINAL PAIN: 0
COUGH: 0
COLOR CHANGE: 0
SHORTNESS OF BREATH: 0
BACK PAIN: 1

## 2022-12-10 ASSESSMENT — PAIN DESCRIPTION - DESCRIPTORS: DESCRIPTORS: SHOOTING

## 2022-12-10 ASSESSMENT — PAIN - FUNCTIONAL ASSESSMENT: PAIN_FUNCTIONAL_ASSESSMENT: 0-10

## 2022-12-10 ASSESSMENT — PAIN DESCRIPTION - FREQUENCY: FREQUENCY: CONTINUOUS

## 2022-12-10 ASSESSMENT — PAIN DESCRIPTION - PAIN TYPE: TYPE: CHRONIC PAIN

## 2022-12-10 ASSESSMENT — PAIN DESCRIPTION - ONSET: ONSET: ON-GOING

## 2022-12-10 ASSESSMENT — PAIN DESCRIPTION - LOCATION: LOCATION: BACK

## 2022-12-10 ASSESSMENT — PAIN SCALES - GENERAL: PAINLEVEL_OUTOF10: 8

## 2022-12-10 ASSESSMENT — PAIN DESCRIPTION - ORIENTATION: ORIENTATION: LOWER

## 2022-12-10 NOTE — DISCHARGE INSTRUCTIONS
Continue naproxen twice daily with food on your stomach    Use 2 Tylenol extra strength tablets every 6 hours in addition to the naproxen    Continue Flexeril as prescribed    Follow-up with pain management on Monday    Try ice to the low back several times per day

## 2022-12-10 NOTE — ED PROVIDER NOTES
[place]  Urgent Care Encounter       CHIEF COMPLAINT       Chief Complaint   Patient presents with    Back Pain       Nurses Notes reviewed and I agree except as noted in the HPI. HISTORY OF PRESENT ILLNESS   Celine Killian is a 69 y.o. female who presents for complaints of lumbar back pain with pain radiating down her left leg. This is a subacute/nearly chronic condition for her. She has been dealing with this for nearly 2 months. She has had an MRI which shows multilevel degenerative changes with areas of mild spinal canal stenosis at the L1-2 and L4-5 levels and up to moderate neuroforaminal stenosis at the L1-2 and L2-3 regions. Patient has been placed on muscle relaxers, NSAIDs and tramadol. She states her pain still remains an 8 on a 10 scale. The pain is lumbar and shoots to the left leg. She does not have pain, numbness or tingling to both legs. She denies any perianal numbness or tingling. No loss of continence of stool or urine. No urinary retention. She has been prescribed physical therapy but then advised to hold until seen by back specialist.  She is scheduled to see pain management in 2 days but states she needs something to help control the pain until then. HPI    REVIEW OF SYSTEMS     Review of Systems   Constitutional:  Negative for activity change, fatigue and fever. Respiratory:  Negative for cough and shortness of breath. Gastrointestinal:  Negative for abdominal pain. Musculoskeletal:  Positive for back pain. Skin:  Negative for color change. Psychiatric/Behavioral:  Negative for behavioral problems. PAST MEDICAL HISTORY   No past medical history on file. SURGICALHISTORY     Patient  has no past surgical history on file.     CURRENT MEDICATIONS       Previous Medications    CYCLOBENZAPRINE (FLEXERIL) 10 MG TABLET    Take 1 tablet by mouth 3 times daily as needed for Muscle spasms    DICLOFENAC SODIUM (VOLTAREN) 1 % GEL    Apply 2 g topically 4 times daily as needed for Pain    LIDOCAINE 4 % EXTERNAL PATCH    Place 1 patch onto the skin every 24 hours Place 1 patch onto the skin daily 12 hours on, 12 hours off. NAPROXEN (NAPROSYN) 500 MG TABLET    Take 1 tablet by mouth 2 times daily as needed for Pain    TERBINAFINE (LAMISIL) 250 MG TABLET    take 1 tablet by mouth once daily    TRAMADOL (ULTRAM) 50 MG TABLET    Take 50 mg by mouth every 6 hours as needed for Pain. ALLERGIES     Patient is is allergic to demerol [meperidine hcl]. Patients   Immunization History   Administered Date(s) Administered    COVID-19, PFIZER PURPLE top, DILUTE for use, (age 15 y+), 30mcg/0.3mL 01/29/2021, 05/07/2021    Influenza, FLUAD, (age 72 y+), Adjuvanted, 0.5mL 10/14/2022    Pneumococcal Conjugate 13-valent ([id number]) 11/02/2016    Pneumococcal Polysaccharide ([id number]) 10/25/2018       FAMILY HISTORY     Patient's family history includes Breast Cancer in her sister; Heart Attack in her father; Stroke in her mother. SOCIAL HISTORY     Patient  reports that she has never smoked. She has never used smokeless tobacco. She reports current alcohol use. She reports that she does not use drugs. PHYSICAL EXAM     ED TRIAGE VITALS  BP: (!) 150/74, Temp: 97.2 °F (36.2 °C), Heart Rate: 88, Resp: 16, SpO2: 99 %,Estimated body mass index is 22.81 kg/m² as calculated from the following:    Height as of this encounter: 5' 8\" (1.727 m). Weight as of this encounter: 150 lb (68 kg). ,No LMP recorded. Patient is postmenopausal.    Physical Exam  Constitutional:       General: She is not in acute distress. Appearance: She is normal weight. She is not ill-appearing. HENT:      Head: Normocephalic. Cardiovascular:      Rate and Rhythm: Normal rate. Pulmonary:      Effort: Pulmonary effort is normal.   Abdominal:      General: Abdomen is flat. Bowel sounds are normal.      Palpations: Abdomen is soft.    Musculoskeletal:      Lumbar back: Tenderness and bony tenderness present. Back:    Skin:     General: Skin is warm and dry. Capillary Refill: Capillary refill takes less than 2 seconds. Neurological:      Mental Status: She is alert. DIAGNOSTIC RESULTS     Labs:No results found for this visit on 12/10/22. IMAGING:    No orders to display         EKG:      URGENT CARE COURSE:     Vitals:    12/10/22 0811   BP: (!) 150/74   Pulse: 88   Resp: 16   Temp: 97.2 °F (36.2 °C)   TempSrc: Temporal   SpO2: 99%   Weight: 150 lb (68 kg)   Height: 5' 8\" (1.727 m)       Medications   methylPREDNISolone acetate (DEPO-MEDROL) injection 80 mg (80 mg IntraMUSCular Given 12/10/22 0828)            PROCEDURES:  None    FINAL IMPRESSION      1. Subacute lumbar radiculopathy          DISPOSITION/ PLAN     Patient presents for lumbar radiculopathy, low back pain. This is a subacute condition that she is being seen by primary care for. She has pain management referral appointment in 2 days. I will treat her with Depo-Medrol to try to give her some relief of the radicular pain until seen by pain management. She will continue her other medications. She will add Tylenol and try ice to the low back.       PATIENT REFERRED TO:  Autumn Olson MD  [facility] [address]  / [facility] [id number]      DISCHARGE MEDICATIONS:  New Prescriptions    No medications on file       Discontinued Medications    No medications on file       Current Discharge Medication List          SIDDHARTHA Urrutia CNP    (Please note that portions of this note were completed with a voice recognition program. Efforts were made to edit the dictations but occasionally words are mis-transcribed.)           SIDDHARTHA Urrutia CNP  12/10/22 5419

## 2022-12-12 ENCOUNTER — OFFICE VISIT (OUTPATIENT)
Dept: PHYSICAL MEDICINE AND REHAB | Age: 77
End: 2022-12-12
Payer: MEDICARE

## 2022-12-12 ENCOUNTER — TELEPHONE (OUTPATIENT)
Dept: PHYSICAL MEDICINE AND REHAB | Age: 77
End: 2022-12-12

## 2022-12-12 VITALS
DIASTOLIC BLOOD PRESSURE: 78 MMHG | BODY MASS INDEX: 24.16 KG/M2 | WEIGHT: 159.4 LBS | SYSTOLIC BLOOD PRESSURE: 148 MMHG | HEIGHT: 68 IN

## 2022-12-12 DIAGNOSIS — M47.816 LUMBAR SPONDYLOSIS: ICD-10-CM

## 2022-12-12 DIAGNOSIS — M48.061 SPINAL STENOSIS OF LUMBAR REGION WITHOUT NEUROGENIC CLAUDICATION: Primary | ICD-10-CM

## 2022-12-12 DIAGNOSIS — M51.37 DDD (DEGENERATIVE DISC DISEASE), LUMBOSACRAL: ICD-10-CM

## 2022-12-12 DIAGNOSIS — G89.4 CHRONIC PAIN SYNDROME: ICD-10-CM

## 2022-12-12 DIAGNOSIS — M54.16 LUMBAR RADICULOPATHY: ICD-10-CM

## 2022-12-12 DIAGNOSIS — M48.07 NEUROFORAMINAL STENOSIS OF LUMBOSACRAL SPINE: ICD-10-CM

## 2022-12-12 DIAGNOSIS — M47.816 FACET HYPERTROPHY OF LUMBAR REGION: ICD-10-CM

## 2022-12-12 DIAGNOSIS — M79.18 MYOFASCIAL PAIN: ICD-10-CM

## 2022-12-12 PROCEDURE — 99205 OFFICE O/P NEW HI 60 MIN: CPT | Performed by: NURSE PRACTITIONER

## 2022-12-12 PROCEDURE — 1123F ACP DISCUSS/DSCN MKR DOCD: CPT | Performed by: NURSE PRACTITIONER

## 2022-12-12 RX ORDER — TERBINAFINE HYDROCHLORIDE 250 MG/1
250 TABLET ORAL DAILY
COMMUNITY

## 2022-12-12 RX ORDER — CYCLOBENZAPRINE HCL 10 MG
10 TABLET ORAL 3 TIMES DAILY PRN
COMMUNITY

## 2022-12-12 RX ORDER — TRAMADOL HYDROCHLORIDE 50 MG/1
50 TABLET ORAL 2 TIMES DAILY PRN
Qty: 60 TABLET | Refills: 0 | Status: SHIPPED | OUTPATIENT
Start: 2022-12-12 | End: 2023-01-11

## 2022-12-12 NOTE — TELEPHONE ENCOUNTER
Patient is not scheduled for procedure because her insurance is changing 01/01/2023. She was wondering if you would order physical therapy for her to complete at [facility]. Tammi

## 2022-12-12 NOTE — PROGRESS NOTES
Chronic Pain/PM&R Clinic Note     Encounter Date: 12/12/22    Subjective:   Chief Complaint:   Chief Complaint   Patient presents with    Back Pain     Pt reports left hip pain that radiates to lower legs. History of Present Illness:   Mikle Goodell is a 69 y.o. female seen in the clinic initially on 12/12/2022 [facility] back pain. She has a personal medical history of osteopenia, situational stress, insomnia. She reports that she also discussed for years ago, has been doing more things when house, lifting and moving things, as well as she had recently moved    Patient presents with complaints of bilateral low back pain, does go into the left buttocks and down the left leg to her toes at times. She also reports she has bilateral calf pain that is worse at night. She states this pain has been ongoing for about 3 months or more. She states that she was working in the yard, lifting pavers, raking, and started have some pain. She states it has just been worsening over time. She describes her calf pain is restless, cramping, charley horses, worse at night. She describes the low back and left buttocks pain as a constant ache, and when she does get the left leg symptoms feels like a burning, shocking. She denies any numbness, loss of bowel or bladder control. She reports nothing in particular makes the pain better or worse. She states certain movements or exercises that she did in therapy would exacerbate it. She has tried ice, heat, massage with no relief. She was in physical therapy, stated some movements felt like it helped her pain, but the mother made the pain worse. She states this is old to see what our plan was. She reports she is not sleeping well, pain will wake her up throughout the night, keep her up at night.   She states when the pain is flared up really bad, she feels a little weakness in her left leg, will use a cane or hold onto things if she needs to but not time. She states she has not had any falls. She does report she is sleeping in a recliner as she cannot lay flat or sleep very well at night. She states she has been getting steroid injections at the ED occasionally, most recent was 12/10/2022. She states it helps for about a week and then pain comes back. Pain scale : at worst pain is 10/10, at best pain is 2/10 after steroid injection at ED. History of Interventions:   Surgery: No previous lumbar surgeries  Injections: None    Current Treatment Medications:   Naproxen - mild relief  Voltaren- - no relief  Flexeril - helps some but makes her groggy   Tylenol - no relief     Historical Treatment Medications:   Tramadol - mild relief   Lidocaine patches - no relief    Imaging:  Lumbar x-ray 11/5/2022  PROCEDURE: XR LUMBAR SPINE (2-3 VIEWS)       CLINICAL INFORMATION: 71-year-old female with left lower back pain for 3 weeks. COMPARISON: No prior study. TECHNIQUE: AP and lateral views of the lumbar spine. FINDINGS:       There are 5 nonrib-bearing lumbar vertebral bodies. The lumbar vertebral body heights and AP alignment are preserved. There is mild dextroconvex curvature of the lumbar spine. There is generalized osteopenia. There is disc space narrowing at L1-L2. Facet arthropathy is present in the lower lumbar spine. Atherosclerotic calcifications through the abdominal aorta. There is narrowing of the sacroiliac joints. Impression   Degenerative changes with no acute fracture or subluxation. MRI lumbar spine 11/29/2022  PROCEDURE: MRI LUMBAR SPINE WO CONTRAST       CLINICAL INFORMATION: Pain of left lower extremity, Left sided sciatica. Low back pain radiating down both legs. No known injury. COMPARISON: Lumbar spine radiographs dated 11/5/2022. TECHNIQUE: Sagittal and axial T1 and T2-weighted images were obtained through the lumbar spine.        FINDINGS:   There is a mild dextrocurvature of the lumbar spine, stable compared to prior radiographs. There is otherwise anatomic vertebral body height and alignment. There is disc space narrowing throughout the lumbar spine most pronounced at L1-2 and with    relative sparing at L5-S1, similar to prior radiographs. Marrow signal is within normal limits. The conus terminates in a normal fashion at the L1 level. No cauda equina nerve root thickening or clumping is identified. Paraspinal soft tissues are    unremarkable. At T12-L1 there is no significant spinal canal or neuroforaminal stenosis. At L1-2 there is a shallow disc bulge which mildly indents thecal sac and may contact traversing L2 nerve roots. There is mild to moderate right and moderate left neural foraminal stenosis in association with facet hypertrophy and ligamentum flavum    thickening. At L2-3 there is a shallow disc bulge which mildly indents thecal sac but does not contact traversing nerve roots. There is mild to moderate right and moderate left neural foraminal stenosis in association with facet hypertrophy and ligamentum flavum    thickening. At L3-4 there is a shallow disc bulge which mildly indents thecal sac and may contact traversing L4 nerve roots bilaterally. There is mild to moderate bilateral foraminal stenosis in association with facet hypertrophy and ligamentum flavum thickening. At L4-5 there is a shallow disc bulge which mildly indents thecal sac but does not contact traversing nerve roots. There is mild bilateral neural foraminal stenosis in association with facet hypertrophy and ligamentum flavum thickening. At L5-S1 is no significant spinal canal stenosis. There is mild bilateral neural foraminal stenosis in association with facet hypertrophy and ligamentum flavum thickening.            Impression    Multilevel degenerative changes with areas of mild spinal canal stenosis at the L1-2 and L4-5 levels and up to moderate neuroforaminal stenosis at L1-2 and L2-3. History reviewed. No pertinent past medical history. Past Surgical History:   Procedure Laterality Date    CARPAL TUNNEL RELEASE  1999    HYSTERECTOMY (CERVIX STATUS UNKNOWN)  1994    TONSILLECTOMY  1963       Family History   Problem Relation Age of Onset    Stroke Mother     Heart Attack Father     Breast Cancer Sister          Medications & Allergies:   Current Outpatient Medications   Medication Instructions    cyclobenzaprine (FLEXERIL) 10 mg, Oral, 3 TIMES DAILY PRN    diclofenac sodium (VOLTAREN) 2 g, Topical, 4 TIMES DAILY PRN    lidocaine 4 % external patch 1 patch, TransDERmal, EVERY 24 HOURS, Place 1 patch onto the skin daily 12 hours on, 12 hours off.    naproxen (NAPROSYN) 500 mg, Oral, 2 TIMES DAILY PRN    sertraline (ZOLOFT) 50 mg, Oral, DAILY    terbinafine (LAMISIL) 250 MG tablet take 1 tablet by mouth once daily    terbinafine (LAMISIL) 250 mg, Oral, DAILY    traMADol (ULTRAM) 50 mg, EVERY 6 HOURS PRN       Allergies   Allergen Reactions    Demerol [Meperidine Hcl] Nausea And Vomiting       Review of Systems:   Constitutional: negative for weight changes or fevers  Genitourinary: negative for bowel/bladder incontinence   Musculoskeletal: positive for low back pain, left SI pain  Neurological: positive for left leg weakness, negative for numbness/tingling  Behavioral/Psych: negative for anxiety/depression   All other systems reviewed and are negative    Objective:     Vitals:    12/12/22 0743   BP: (!) 148/78       Constitutional: Pleasant, no acute distress   Head: Normocephalic, atraumatic   Eyes: Conjunctivae normal   Neck: Supple, symmetrical   Lungs: Normal respiratory effort, non-labored breathing   Cardiovascular: Limbs warm and well perfused   Abdomen: Non-protruded   Musculoskeletal: Muscle bulk symmetric, no atrophy, no gross deformities    · Lumbar Spine: ROM WNL. Lumbar paraspinals non-tender to palpation bilaterally. SLR positive bilaterally.  JUAN neg bilaterally. GAENSLEN neg bilaterally. Negative facet loading bilaterally. Bilateral SI joints non-tender to palpation. SI Distraction maneuver negative on left/right side. Bilateral greater trochanters non-tender to palpation. Neurological: Cranial nerves II-XII grossly intact. · Gait - Normal, non-antalgic gait. Ambulates without assistive device. · Motor: 5/5 muscle strength in bilateral hip flexion, knee flexion, knee extension, ankle dorsiflexion, and ankle plantar flexion   · Sensory: LT sensation intact in lower limbs   Skin: No rashes or lesions present   Psychological: Cooperative, no exaggerated pain behaviors     Assessment:    Diagnosis Orders   1. Spinal stenosis of lumbar region without neurogenic claudication        2. Neuroforaminal stenosis of lumbosacral spine        3. Facet hypertrophy of lumbar region        4. Lumbar spondylosis        5. DDD (degenerative disc disease), lumbosacral        6. Chronic pain syndrome             Sherlyn Holt is a 69 y. o.female presenting to the pain clinic for evaluation of low back pain with left leg symptoms, and bilateral calf pain. Did discuss with her procedures to assist with pain control such as; lumbar epidural steroid injection at L4-5, transforaminal lumbar epidural steroid injections, lumbar facet series, SI joint injection series. With her complaints we will start with a lumbar epidural steroid injection at L4-5. Did discuss with her have to be cautious with steroids that she has had recent steroid injection from the emergency department, and that we need to not utilize those in order to get a procedure with us using steroid. At this time I have set her up with tramadol 50 mg twice daily. Discussed that she can continue naproxen, Tylenol, ice, heat, stretches as well assist with pain. I will see her back after procedures      Plan: The following treatment recommendations and plan were discussed in detail with Sherlyn Holt.     Imaging: I have reviewed patients imaging of lumbar MRI, lumbar x-ray and results were discussed with patient today. Analgesics: The patient is currently managing their pain with the use of Naproxen which is prescribed by PCP. We recommend that the patient follow the recommendation of the prescribing provider with regard to the above medication(s) and contact their prescribing provider for refills if needed. The side effect profile of long-term opioid use was discussed and recommended emphasis on multimodal strategies for pain management. Patient is taking Acetaminophen. Patient informed that the maximum amount of acetaminophen taken on a regular basis should only be 4000 mg per day. Patient is taking Naproxen. Patient is advised to take as prescribed and not take on an empty stomach. Adjuvants: For continued chronic pain with associated musculoskeletal component, the patient is advised to start Tramadol 50 mg BID PRN for severe pain(7-10/10). Educated on side effects, addiction, tolerance, safe medication storage, office policy refill request    OARRS reviewed, pain contract agreement signed    Interventions: In presence of lumbosacral radiating pain, the option of lumbar epidural steroid injection approach at L4-5 was chosen. The risks and benefits were discussed in detail with the patient. Patient wants to proceed with the injection with Dr Mariann Cruz     Anticoagulation/NPO Recommendations:   Patient does need to hold any medications prior to the procedure - NSAIDs   Patient will need to be NPO x 8 hours prior to the procedure. We will start an IV prior to the procedure    Multidisciplinary Pain Management:   In the presence of complex, chronic, and multi-factorial pain, the importance of a multidisciplinary approach to pain management in the patients management regimen was emphasized and discussed in great detail.    PHYSICAL THERAPY: Patient is advised to see a physical therapist for gentle stretching exercises and conditioning exercises for management of pain. The patient was also advised to continue physical therapy and stretching exercises at home and cognitive behavioral and/or group therapy. Referrals:      Prescriptions Written This Visit:   Tramadol 50 mg BID PRN    Follow-up:   After procedures     It was my pleasure to evaluate Chelita Degroot today. I spent over 60 minutes evaluating this patient, reviewing previous notes and images and completing documentation.        Lisa Tavarez, APRN - CNP   Interventional Pain Management/PM&R   [place]

## 2022-12-12 NOTE — PATIENT INSTRUCTIONS
Learning About Lumbar Epidural Steroid Injections  What is a lumbar epidural steroid injection? A lumbar epidural injection is a shot into the epidural space--the area in your back around the spinal cord. The shot may help reduce pain, tingling, or numbness in your back, buttock, or leg. The shot may have a steroid to reduce pain and swelling and a local anesthetic to numb nerves. How is a lumbar epidural steroid injection done? The doctor may use an imaging test before or during your injection. This can be an MRI, a CT scan, or an X-ray. These tests can show where your nerve problems are. After finding the right spot, the doctor may inject a numbing medicine into the skin where you will get the steroid injection. Then he or she puts the needle for the steroid into the numbed area. You may feel some pressure. You could feel some stinging or burning during the injection. How long does an epidural steroid injection take? It takes about 10 to 15 minutes to get this injection. You will probably go home about 20 to 30 minutes after you get it. What can you expect after a lumbar epidural steroid injection? If your injection had local anesthetic and a steroid, your legs may feel heavy or numb right after. You will probably be able to walk. But you may need to be extra careful. Take care not to lose your balance, and be sure to follow your doctor's instructions. If your injection contained local anesthetic, you may feel better right away. But this pain relief will last only a few hours. Your pain will probably return. This is because the steroids have not started working yet. Before the steroids start to work, your back may be sore for a few days. These injections don't always work. When they do, it takes 1 to 5 days. This pain relief can last for several days to a few months or longer. You may want to do less than normal for a few days. But you may also be able to return to your daily routine.   Some people are dizzy or feel sick to their stomach after getting this injection. These symptoms usually don't last very long. If your pain is better, you may be able to keep doing your normal activities or physical therapy. But try not to overdo it, even if your back pain has improved a lot. If your pain is only a little better or if it comes back, your doctor may recommend another injection in a few weeks. If your pain has not changed, talk to your doctor about other treatment choices. Follow-up care is a key part of your treatment and safety. Be sure to make and go to all appointments, and call your doctor if you are having problems. It's also a good idea to know your test results and keep a list of the medicines you take. Where can you learn more? Go to https://[email or web address]. org and sign in to your [employer] account. Enter K425 in the MyMusic box to learn more about \"Learning About Lumbar Epidural Steroid Injections. \"     If you do not have an account, please click on the \"Sign Up Now\" link. Current as of: July 1, 2021               Content Version: 13.1  © 5513-3723 [employer], [employer]. Care instructions adapted under license by [facility] ([facility]). If you have questions about a medical condition or this instruction, always ask your healthcare professional. [address] any warranty or liability for your use of this information.

## 2022-12-13 ENCOUNTER — TELEPHONE (OUTPATIENT)
Dept: FAMILY MEDICINE CLINIC | Age: 77
End: 2022-12-13

## 2022-12-13 NOTE — TELEPHONE ENCOUNTER
Called and spoke to patient about office notes. Pt states she needs them and I can just mail them to her. Printed notes out and sent out today.

## 2022-12-13 NOTE — TELEPHONE ENCOUNTER
----- Message from Raghu Catalan sent at 12/12/2022  1:29 PM EST -----  Subject: Message to Provider    QUESTIONS  Information for Provider? Pt seen Raegan Luis on 12/01/2022, she states   that she was told to stop pt, since she is now seeing a pain management   dr. she states she was told to stop because the pt was making the pain   worse. Due to insurance purposes pt needs the appt notes for this. Please   contact pt and let her know   ---------------------------------------------------------------------------  --------------  Evy SRINIVASAN  [phone]; OK to leave message on voicemail  ---------------------------------------------------------------------------  --------------  SCRIPT ANSWERS  Relationship to Patient?  Self

## 2022-12-19 ENCOUNTER — HOSPITAL ENCOUNTER (OUTPATIENT)
Dept: PHYSICAL THERAPY | Age: 77
Setting detail: THERAPIES SERIES
Discharge: HOME | End: 2022-12-19
Payer: MEDICARE

## 2022-12-19 PROCEDURE — 97162 PT EVAL MOD COMPLEX 30 MIN: CPT

## 2022-12-19 PROCEDURE — 97110 THERAPEUTIC EXERCISES: CPT

## 2022-12-19 NOTE — PROGRESS NOTES
** PLEASE SIGN, DATE AND TIME CERTIFICATION BELOW AND RETURN TO [facility] OUTPATIENT REHABILITATION (FAX #: [phone]). ATTEST/CO-SIGN IF ACCESSING VIA INEnglishUp. THANK YOU.**    I certify that I have examined the patient below and determined that Physical Medicine and Rehabilitation service is necessary and that I approve the established plan of care for up to 90 days or as specifically noted. Attestation, signature or co-signature of physician indicates approval of certification requirements.    ________________________ ____________ __________  Physician Signature   Date   Time  [facility]  PHYSICAL THERAPY  [x] EVALUATION  [] DAILY NOTE (LAND) [] DAILY NOTE (AQUATIC ) [] PROGRESS NOTE [] DISCHARGE NOTE    [x] [address] [facility]   [] [place] [address]    [] [facility]   [] [facility]    Date: 2022  Patient Name:  Patricia Thacker  : 1945  MRN: [id number]  CSN: [id number]    Referring Practitioner TOO Lambert*   Diagnosis Spinal stenosis of lumbar region without neurogenic claudication [M48.061]  Neuroforaminal stenosis of lumbosacral spine [M48.07]  Facet hypertrophy of lumbar region [M47.816]  Lumbar spondylosis [M47.816]  DDD (degenerative disc disease), lumbosacral [M51.37]  Lumbar radiculopathy [M54.16]  Chronic pain syndrome [G89.4]  Myofascial pain [M79.18]    Treatment Diagnosis Lumbar pain with radiculopathy LLE>RLE, with difficulty tolerating lying on back, functional mobility/activity   Date of Evaluation 22    Additional Pertinent History Osteopenia, history of previous PT appointments 11/10/2022 to 2022 for 4 visits for sciatica left side. Lateral stenosis and facet disorder. Was on hold and went to pain management physician.        Functional Outcome Measure Used Modified Oswestry   Functional Outcome Score 16/50 (22)       Insurance: Primary: Payor: Negin Ndiaye /  /  / , Secondary:    Authorization Information: PRE CERTIFICATION REQUIRED: NO  INSURANCE THERAPY BENEFIT:  NO VISIT LIMIT   AQUATIC THERAPY COVERED: YES  MODALITIES COVERED:  YES   TELEHEALTH COVERED:    REFERENCE NUMBER   Visit # 1, 1/10 for progress note   Visits Allowed: No visit limit   Recertification Date: 7/27/7715   Physician Follow-Up: January 2023   Physician Orders: Evaluation first available appointment, with dry needling   History of Present Illness: Patient reports of back pain that came on in October of this year. She has done a lot of heavy lifting and packing due to moving. She also was active with yardwork ie raking yard. Notes pain pointing to lumbosacral region. She states that she did have sharp shooting pain down Left leg with rolling over in bed. Sharp pain in left leg goes to primarily 4th toe and some of her 5th toe. Burning feeling at toes. Notes pain also pointing to posterior lateral thigh. Some pain in left buttock but not too bad. The Right LE she just feels spasms or restless leg, pressure in her right calf. \"Veins are going to explode\" This sensation in her right calf , back and LLE symptoms are triggered with lying on her back. Notes most comfortable resting in recliner chair. Back pain is less with walking. Sitting does relieve symptoms somewhat. Frequent position changes also help. She takes hot showers to decrease her pain. Medication: Naproxen, Tramadol, Tylenol extra strength, muscle relaxant. Lidocaine patches not sure if helped. MRI and Xrays have been completed. Patient goals: to be painfree and be able to carry out normal activities with housework and gardening. SUBJECTIVE: Patient reports of back pain that came on in October of this year. She has done a lot of heavy lifting and packing due to moving. She also was active with yardwork ie raking yard. Notes pain pointing to lumbosacral region.  She states that she did have sharp shooting pain down Left leg with rolling over in bed. Sharp pain in left leg goes to primarily 4th toe and some of her 5th toe. Burning feeling at toes. Notes pain also pointing to posterior lateral thigh. Some pain in left buttock but not too bad. The Right LE she just feels spasms or restless leg, pressure in her right calf. \"Veins are going to explode\" This sensation in her right calf , back and LLE symptoms are triggered with lying on her back. Notes most comfortable resting in recliner chair. Back pain is less with walking. Sitting does relieve symptoms somewhat. Frequent position changes also help. She takes hot showers to decrease her pain. Medication: Naproxen, Tramadol, Tylenol extra strength, muscle relaxant. Lidocaine patches not sure if helped. MRI and Xrays have been completed. Patient goals: to be painfree and be able to carry out normal activities with housework and gardening. Social/Functional History and Current Status:  Medications and Allergies have been reviewed and are listed on Medical History Questionnaire. Iliana Lancaster lives alone in a single story home with stairs and a handrail to enter. 2 steps    Task Previous Current   ADLs  Independent Modified Independent   IADL's Independent Modified Independent   Ambulation Independent Independent   Transfers Independent Modified Independent   Recreation Independent gardening, housework, yoga 5x per week online, always exercises with walking. Dependent/Unable   [employer] Integration Independent Independent   Driving Active  Active    Work Retired  Retired     Objective:    OBSERVATION   Pain 3/10 pain level at lumbosacral region, LLE>RLE. Sometimes can increase to 8/10 pain level   Palpation Note tightness along right>left paraspinal of lower thoracic and lumbar region. Note tenderness Lumbosacral region. Left mid gluteal, left glut medius.     Sensation Intact sensation but has  radiculopathy to LEs   Edema nil   Spinal Accessory Motion Decreased lumbar articulation with increased flexion lower thoracic region with forward flexion, hip flexion    Bed Mobility Independent using logroll technique   Transfers indepedent   Ambulation Ambulates with right lateral shift of trunk note slight decreased wt shift through LLE with ambulation. Stairs    Balance        POSTURE    No Deficit Deficit Comments   Forward Head  x    Rounded Shoulders  x    Kyphosis x     Lordosis  x Reduced lumbar lordotic curve/flat back    Lateral Shift  x Right lateral shift of trunk. Scoliosis      Iliac Crest x     PSIS x     ASIS x     Leg Length Discrp x     Slumped Sitting          TRUNK RANGE OF MOTION   Flexion: WNLS fingertips to floor   Extension: Moderate restriction with pain localized lumbosacral   Lateral Flexion Left:    Lateral Flexion Right:    Rotation Left:    Rotation Right:    Trunk Range of Motion is [facility]  []     LOWER EXTREMITY RANGE OF MOTION    Left Right Comments   Hip Flexion knee to chest WNLS WNLS    Hip Extension      Hip ABDuction WNLS WNLS    Hip ADDuction      Hip Internal Rotation 10 degrees 30 degrees    Hip External Rotation 30 degrees 45 degrees    Hip Range of Motion is [facility] [place]  []      Knee Flexion      Knee Extension      Knee Range of Motion is [facility]  [x]       LOWER EXTREMITY STRENGTH    Left Right Comments   Hip Flexion 4/5 4/5    Hip Abduction      Hip Adduction      Hip Extension      Hip External Rotation      Knee Flexion 5/5 5/5    Knee Extension 5/5 5/5    Ankle DF 5/5 5/5     Ankle PF 4/5 5/5    LE strength is WFL []      CORE STRENGTH   Decreased core strength noted with MMT with decreased awareness of pelvic neutral and wt shifting off LLE/ischium with testing.         SPECIAL TESTS (+/-)    Left Right Comments   SLR  70 degrees 60 degrees Negative neural tension test    90/90 Hamstring length      SKTC WNLS - WNLS No back pain   DKTC WNLS WNLS Feels better with less back pain   Slump + - LLE positive neural tension   SI Compression/Distraction - - JUAN + -    FADIR + -    Claire Span          TREATMENT   Precautions:    Pain:     \"X in shaded column indicates activity completed today    *\" next to exercise/intervention indicates progression   Modalities Parameters/  Location  Notes                     Manual Therapy Time/Technique  Notes                     Exercise/Intervention   Notes   Logroll technique for bed mobility   x    Dry needling explanation and informational material issued   x    Decompression position in 90/90 for pain management   x    Explanation of foraminal stenosis, spondylosis, DDD, radiculopathy   x                                                       Specific Interventions Next Treatment: dry needling, myofascial decompression-dynamic cupping paraspinals lumbar region, soft tissue mobilization, decompression position as needed 90/90 position, lumbar articulation in flexion and painfree extension, thoracic articulation avoiding flexion due to osteopenia. Core strengthening, body mechanics, LLE strengthening ankle and hip. Activity/Treatment Tolerance:  [x]  Patient tolerated treatment well  []  Patient limited by fatigue  []  Patient limited by pain   []  Patient limited by medical complications  []  Other:     Assessment: Patient referred to PT for evaluation and also dry needling. Patient demonstrates decreased lumbar spine articulation with flexion and extension. Patient has pain produced with end range extension and left lateral flexion into left LLE. Also noted + neural tension with slump test LLE. Noted mild decreased flexibility at hamstrings. Muscle guarding and tension at paraspinals of lumbar lower thoracic region, left mid gluteal region, glut medius, piriformis region. Decreased core strength and decreased LLE strength noted. Will follow 2x per week addressing listed deficits with skilled PT.      Body Structures/Functions/Activity Limitations: impaired activity tolerance, impaired muscle tone, impaired ROM, impaired strength, pain, abnormal gait, and abnormal posture  Prognosis: good      Goals    Patient Goal:   Patient goals: to be painfree and be able to carry out normal activities with housework and gardening. Short Term Goals: 4 weeks  Patient to report of pain levels 3-4/10 to no greater than 6/10 with pain managed at 2/10 in 4 weeks at lumbosacral region and centralization of LLE radiculopathy with abolished symptoms to 4th and 5th toes. Patient to demonstrate increased flexibility and lumbar articulation with trunk flexion, extension and thoracic mobility in extension from moderate restrictions to min restrictions at these segments. Patient to demonstrate increased strength at core musculature with progression in program with increased awareness of optimal lumbar neutral, pelvic stability and strength at abdominals/back muscles. Patient to demonstrate increased strength LLE at ankle plantarflexors, hip flexors, hip abductors, hip extensors 4-/5 to 5/5. Long Term Goals: 8 weeks  Modified Oswestry from 16/50 to 10/50  2. Patient to demonstrate independence in a HEP with follow through of ex for core strength, LE strength, flexibility, body mechanics and decreased muscle tightness and pain management. Patient Education:   [x]  HEP/Education Completed: Plan of Care, Goals, logroll technique getting in and out of bed, 90/90 position for decompression position of lumbar spine. No cobra poses as in yoga which is over extension of spine. [employer] Access Code:  []  No new Education completed  []  Reviewed Prior HEP      [x]  Patient verbalized and/or demonstrated understanding of education provided. []  Patient unable to verbalize and/or demonstrate understanding of education provided. Will continue education.   []  Barriers to learning:     PLAN:  Treatment Recommendations: Strengthening, Range of Motion, Functional Mobility Training, Transfer Training, Neuromuscular Re-education, Manual Therapy - Soft Tissue Mobilization, Pain Management, Home Exercise Program, Patient Education, Integrative Dry Needling, Aquatics, and Modalities    [x]  Plan of care initiated. Plan to see patient 2 times per week for 8 weeks to address the treatment planned outlined above.   []  Continue with current plan of care  []  Modify plan of care as follows:    []  Hold pending physician visit  []  Discharge    Time In 1605   Time Out 1700   Timed Code Minutes: 15 min   Total Treatment Time: 55 min     Electronically Signed by: Troy Colon PT

## 2022-12-22 NOTE — DISCHARGE SUMMARY
[facility]    Patient Name: Graham Chisholm        CSN: [id number]   YOB: 1945  Gender: female  Otto Owens, TOO*,    Spinal stenosis of lumbar region without neurogenic claudication [M48.061]  Neuroforaminal stenosis of lumbosacral spine [M48.07]  Facet hypertrophy of lumbar region [M47.816]  Lumbar spondylosis [M47.816]  DDD (degenerative disc disease), lumbosacral [M51.37]  Lumbar radiculopathy [M54.16]  Chronic pain syndrome [G89.4]  Myofascial pain [M79.18] ,      Patient is discharged from Physical Therapy services at this time. See last note for details related to results of therapy and goal achievement. Reason for discharge: Other medical issues impacting ability to participate in Outpatient Therapy. Taina Oglesby, PT

## 2022-12-28 ENCOUNTER — HOSPITAL ENCOUNTER (OUTPATIENT)
Dept: PHYSICAL THERAPY | Age: 77
Setting detail: THERAPIES SERIES
Discharge: HOME | End: 2022-12-28
Payer: MEDICARE

## 2022-12-28 PROCEDURE — 97110 THERAPEUTIC EXERCISES: CPT

## 2022-12-28 NOTE — PROGRESS NOTES
[] EVALUATION  [x] DAILY NOTE (LAND) [] DAILY NOTE (AQUATIC ) [] PROGRESS NOTE [] DISCHARGE NOTE    [x] OUTPATIENT REHABILITATION CENTER [facility]   [] [place] [address]    [] [facility]   [] [facility]    Date: 2022  Patient Name:  Margarito Anderson  : 1945  MRN: [id number]  CSN: [id number]    Referring Practitioner Epi Reason, A*   Diagnosis Spinal stenosis of lumbar region without neurogenic claudication [M48.061]  Neuroforaminal stenosis of lumbosacral spine [M48.07]  Facet hypertrophy of lumbar region [M47.816]  Lumbar spondylosis [M47.816]  DDD (degenerative disc disease), lumbosacral [M51.37]  Lumbar radiculopathy [M54.16]  Chronic pain syndrome [G89.4]  Myofascial pain [M79.18]    Treatment Diagnosis Lumbar pain with radiculopathy LLE>RLE, with difficulty tolerating lying on back, functional mobility/activity   Date of Evaluation 22    Additional Pertinent History Osteopenia, history of previous PT appointments 11/10/2022 to 2022 for 4 visits for sciatica left side. Lateral stenosis and facet disorder. Was on hold and went to pain management physician. Functional Outcome Measure Used Modified Oswestry   Functional Outcome Score 16/50 (22)       Insurance: Primary: Payor: Fuentes Coffey /  /  / ,   Secondary:    Authorization Information: PRE CERTIFICATION REQUIRED: NO  INSURANCE THERAPY BENEFIT:  NO VISIT LIMIT   AQUATIC THERAPY COVERED: YES  MODALITIES COVERED:  YES   TELEHEALTH COVERED:    REFERENCE NUMBER   Visit # 2, 2/10 for progress note   Visits Allowed: No visit limit   Recertification Date:    Physician Follow-Up: 2023   Physician Orders: Evaluation first available appointment, with dry needling   History of Present Illness: Patient reports of back pain that came on in October of this year. She has done a lot of heavy lifting and packing due to moving. She also was active with yardwork ie raking yard.  Notes pain pointing to lumbosacral region. She states that she did have sharp shooting pain down Left leg with rolling over in bed. Sharp pain in left leg goes to primarily 4th toe and some of her 5th toe. Burning feeling at toes. Notes pain also pointing to posterior lateral thigh. Some pain in left buttock but not too bad. The Right LE she just feels spasms or restless leg, pressure in her right calf. \"Veins are going to explode\" This sensation in her right calf , back and LLE symptoms are triggered with lying on her back. Notes most comfortable resting in recliner chair. Back pain is less with walking. Sitting does relieve symptoms somewhat. Frequent position changes also help. She takes hot showers to decrease her pain. Medication: Naproxen, Tramadol, Tylenol extra strength, muscle relaxant. Lidocaine patches not sure if helped. MRI and Xrays have been completed. Patient goals: to be painfree and be able to carry out normal activities with housework and gardening. SUBJECTIVE: Patient reports that her pain today is about a 4/10. She states that it was up to a 6/10 almost all day yesterday. She states that she did take a naproxen this morning and yesterday she had to take naproxen and tylenol all day long. She reports that she has aching pain in the center of the back and a lot of pain in the calfs. Radicular pain from buttocks to toe has improved. Laying down at night her pain seems to be the worst and she is sleeping in the recliner. Dry Needling Safety Questions Response   Are you currently receiving any form of cancer treatment? No   Do you have any form of a bleeding disorder? No   Have you ever fainted or experienced a seizure? No   Do you have a pacemaker or any other electrical implant? No   Are you currently taking any anticoagulants? No   Are you currently taking antibiotics for an infection? No   Do you have a damaged heart valve, metal prosthesis, or other risk of infection?  No   Are you pregnant or actively trying for a pregnancy? No   Do you have breast implants? No   Do you suffer from metal allergies? No   Are you diabetic or do you suffer from impaired wound healing? No   Do you have hepatitis B, hepatitis C, HIV, or any other infectious disease? No   Have you eaten in the last two hours? No         TREATMENT   Precautions:    Pain:     \"X in shaded column indicates activity completed today    *\" next to exercise/intervention indicates progression   Modalities Parameters/  Location  Notes                     Manual Therapy Time/Technique  Notes   Dry Needling - bilateral lumbar paraspinals (50 mm) 5 min X  Tolerated well - assess response next visit and progress into gluteal region as tolerated               Exercise/Intervention   Notes   Logroll technique for bed mobility       Dry needling explanation and informational material issued       Decompression position in 90/90 for pain management   x Reviewed positioning for comfort post dry needling and use of MHP as needed    Explanation of foraminal stenosis, spondylosis, DDD, radiculopathy   x    SKTC* 3x 30 sec x    Piriformis stretch* 3x 30 sec x    Hamstring stretch* 3x 30 sec x Cues to stay within range allowing full knee extension   LTR* 10x 10 sec x                           Specific Interventions Next Treatment: dry needling, myofascial decompression-dynamic cupping paraspinals lumbar region, soft tissue mobilization, decompression position as needed 90/90 position, lumbar articulation in flexion and painfree extension, thoracic articulation avoiding flexion due to osteopenia. Core strengthening, body mechanics, LLE strengthening ankle and hip. Dry needling manual therapy: consisted of the placement of needles in the above noted muscles. A needle was inserted, pistoned, rotated, and/or coned to produce intramuscular mobilization.  These techniques were used to restore functional range of motion, reduce muscle spasm and induce healing in the corresponding musculature. (31954)   Clean Technique was utilized today while applying Dry needling treatment. The treatment sites were cleaned with 70% solution of isopropyl alcohol. The therapist washed/sanitized their hands and utilized treatment gloves prior to inserting the needles. All needles were removed and discarded in the appropriate sharps container. \"      Activity/Treatment Tolerance:  [x]  Patient tolerated treatment well  []  Patient limited by fatigue  []  Patient limited by pain   []  Patient limited by medical complications  []  Other:     Assessment: Performed dry needling to bilateral lumbar paraspinals this date with patient tolerating well. Patient in prone with pillow under lower legs and under pelvis to improve tolerance to position. She reported 4/10 pain at beginning of session and 1/10 at end of session following dry needling and lower extremity stretching. Patient encouraged to utilize 90-90 positioning as needed at home along with MHP for any muscle soreness following dry needling. Goals    Patient Goal:   Patient goals: to be painfree and be able to carry out normal activities with housework and gardening. Short Term Goals: 4 weeks  Patient to report of pain levels 3-4/10 to no greater than 6/10 with pain managed at 2/10 in 4 weeks at lumbosacral region and centralization of LLE radiculopathy with abolished symptoms to 4th and 5th toes. Patient to demonstrate increased flexibility and lumbar articulation with trunk flexion, extension and thoracic mobility in extension from moderate restrictions to min restrictions at these segments. Patient to demonstrate increased strength at core musculature with progression in program with increased awareness of optimal lumbar neutral, pelvic stability and strength at abdominals/back muscles. Patient to demonstrate increased strength LLE at ankle plantarflexors, hip flexors, hip abductors, hip extensors 4-/5 to 5/5. Long Term Goals: 8 weeks  Modified Oswestry from 16/50 to 10/50  2. Patient to demonstrate independence in a HEP with follow through of ex for core strength, LE strength, flexibility, body mechanics and decreased muscle tightness and pain management. Patient Education:   [x]  HEP/Education Completed: Plan of Care, Goals, educated on stenosis and use of flexion preference for HEP at this time; avoiding hyperextension, 90-90 positioning with MHP following DN as needed, importance of hydration following dry needling  [employer] Access Code:  []  No new Education completed  []  Reviewed Prior HEP      [x]  Patient verbalized and/or demonstrated understanding of education provided. []  Patient unable to verbalize and/or demonstrate understanding of education provided. Will continue education. []  Barriers to learning:     PLAN:  Treatment Recommendations: Strengthening, Range of Motion, Functional Mobility Training, Transfer Training, Neuromuscular Re-education, Manual Therapy - Soft Tissue Mobilization, Pain Management, Home Exercise Program, Patient Education, Integrative Dry Needling, Aquatics, and Modalities    []  Plan of care initiated. Plan to see patient 2 times per week for 8 weeks to address the treatment planned outlined above.   [x]  Continue with current plan of care  []  Modify plan of care as follows:    []  Hold pending physician visit  []  Discharge    Time In 1450   Time Out 1535   Timed Code Minutes: 40 min   Total Treatment Time: 45 min     Electronically Signed by: Reji Rangel PT

## 2023-01-04 ENCOUNTER — HOSPITAL ENCOUNTER (OUTPATIENT)
Dept: PHYSICAL THERAPY | Age: 78
Setting detail: THERAPIES SERIES
Discharge: HOME | End: 2023-01-04
Payer: MEDICARE

## 2023-01-04 PROCEDURE — 97110 THERAPEUTIC EXERCISES: CPT

## 2023-01-04 PROCEDURE — 97140 MANUAL THERAPY 1/> REGIONS: CPT

## 2023-01-04 NOTE — PROGRESS NOTES
[] EVALUATION  [x] DAILY NOTE (LAND) [] DAILY NOTE (AQUATIC ) [] PROGRESS NOTE [] DISCHARGE NOTE    [x] OUTPATIENT REHABILITATION [facility]   [] [place] [address]    [] [facility]   [] Ata Jolley    Date: 2023  Patient Name:  Adelaide Meneses  : 1945  MRN: [id number]  CSN: [id number]    Referring Practitioner TOO Cantu*   Diagnosis Spinal stenosis of lumbar region without neurogenic claudication [M48.061]  Neuroforaminal stenosis of lumbosacral spine [M48.07]  Facet hypertrophy of lumbar region [M47.816]  Lumbar spondylosis [M47.816]  DDD (degenerative disc disease), lumbosacral [M51.37]  Lumbar radiculopathy [M54.16]  Chronic pain syndrome [G89.4]  Myofascial pain [M79.18]    Treatment Diagnosis Lumbar pain with radiculopathy LLE>RLE, with difficulty tolerating lying on back, functional mobility/activity   Date of Evaluation 22    Additional Pertinent History Osteopenia, history of previous PT appointments 11/10/2022 to 2022 for 4 visits for sciatica left side. Lateral stenosis and facet disorder. Was on hold and went to pain management physician. Functional Outcome Measure Used Modified Oswestry   Functional Outcome Score 16/50 (22)       Insurance: Primary: Payor: Stefani Waddell /  /  / ,   Secondary:    Authorization Information: PRE CERTIFICATION REQUIRED: NO  INSURANCE THERAPY BENEFIT:  NO VISIT LIMIT   AQUATIC THERAPY COVERED: YES  MODALITIES COVERED:  YES   TELEHEALTH COVERED:    REFERENCE NUMBER   Visit # 3,  3/10 for progress note   Visits Allowed: No visit limit   Recertification Date: 3/90/6980   Physician Follow-Up: 2023   Physician Orders: Evaluation first available appointment, with dry needling   History of Present Illness: Patient reports of back pain that came on in October of this year. She has done a lot of heavy lifting and packing due to moving. She also was active with yardwork ie raking yard.  Notes pain pointing to lumbosacral region. She states that she did have sharp shooting pain down Left leg with rolling over in bed. Sharp pain in left leg goes to primarily 4th toe and some of her 5th toe. Burning feeling at toes. Notes pain also pointing to posterior lateral thigh. Some pain in left buttock but not too bad. The Right LE she just feels spasms or restless leg, pressure in her right calf. \"Veins are going to explode\" This sensation in her right calf , back and LLE symptoms are triggered with lying on her back. Notes most comfortable resting in recliner chair. Back pain is less with walking. Sitting does relieve symptoms somewhat. Frequent position changes also help. She takes hot showers to decrease her pain. Medication: Naproxen, Tramadol, Tylenol extra strength, muscle relaxant. Lidocaine patches not sure if helped. MRI and Xrays have been completed. Patient goals: to be painfree and be able to carry out normal activities with housework and gardening. SUBJECTIVE: Patient reports that pain level remains 4-5/10 today. Continues use of Tramadol use in morning, evening and Tylenol in the afternoon. Difficulty sleeping at night due to back pain. Notes LLE symptoms the bulk of pain, sharp pains on and off yet. Majority symptoms worse at left calf region. Work around house can flare up in left sciatic and buttock and back region, SI.             TREATMENT   Precautions: Lumbar spondylosis, foraminal stenosis LLE calf symptoms   Pain: 4-5/10. Left calf and back.      \"X in shaded column indicates activity completed today    *\" next to exercise/intervention indicates progression   Modalities Parameters/  Location  Notes                     Manual Therapy Time/Technique  Notes   Dry Needling - bilateral lumbar paraspinals (50 mm) 5 min  Tolerated well - assess response next visit and progress into gluteal region as tolerated   Myofascial decompression-dynamic cupping bilateral lumbar paraspinals 3 minutes gliding cups over paraspinals x Pronelying with pillow under abdomen and lower legs   Soft tissue mobilization manual therapy while pronelying left gluteal region and piriformis, glut medius  and light over bilateral paraspinals. 8 minutes x Pronelying with pillow under abdomen and lower legs   Exercise/Intervention   Notes   Logroll technique for bed mobility       Dry needling explanation and informational material issued       Decompression position in 90/90 for pain management   x Reviewed positioning for comfort post dry needling and use of MHP as needed    Explanation of foraminal stenosis, spondylosis, DDD, radiculopathy   x    SKTC 3x 30 sec x SKTC with patient in 90/90 starting position   Piriformis stretch 3x 30 sec x    Hamstring stretch 3x 30 sec x Cues to stay within range allowing full knee extension   LTR* 10x 10 sec  Hold on rotations          Pelvic stability with abdominal engagement neutral pelvis  5x  x Inhale release abdominal, exhale tense abdominals/core   Pelvic tilt proper breathing sequence 10x  x Exhale on posterior pelvic tilt inhale back to neutral pelvis     Specific Interventions Next Treatment: dry needling, myofascial decompression-dynamic cupping paraspinals lumbar region, soft tissue mobilization, decompression position as needed 90/90 position, lumbar articulation in flexion and painfree extension, thoracic articulation avoiding flexion due to osteopenia. Core strengthening, body mechanics, LLE strengthening ankle and hip. Activity/Treatment Tolerance:  [x]  Patient tolerated treatment well  []  Patient limited by fatigue  []  Patient limited by pain   []  Patient limited by medical complications  []  Other:     Assessment: Discussed and demonstrated 90/90 position with moist heat to lumbar region. Did active stretches first with SKTC, piriformis and hamstring stretches. Taught patient proper breathing technique for core/abdominal engagement and addition of pelvic tilts. Ended session with dynamic cupping/myofascial decompression and soft tissue mobilization. Patient reports of feeling 90% better and only 1/10 left calf symptoms. Feels treatment was beneficial.         Goals    Patient Goal:   Patient goals: to be painfree and be able to carry out normal activities with housework and gardening. Short Term Goals: 4 weeks  Patient to report of pain levels 3-4/10 to no greater than 6/10 with pain managed at 2/10 in 4 weeks at lumbosacral region and centralization of LLE radiculopathy with abolished symptoms to 4th and 5th toes. Patient to demonstrate increased flexibility and lumbar articulation with trunk flexion, extension and thoracic mobility in extension from moderate restrictions to min restrictions at these segments. Patient to demonstrate increased strength at core musculature with progression in program with increased awareness of optimal lumbar neutral, pelvic stability and strength at abdominals/back muscles. Patient to demonstrate increased strength LLE at ankle plantarflexors, hip flexors, hip abductors, hip extensors 4-/5 to 5/5. Long Term Goals: 8 weeks  Modified Oswestry from 16/50 to 10/50  2. Patient to demonstrate independence in a HEP with follow through of ex for core strength, LE strength, flexibility, body mechanics and decreased muscle tightness and pain management. Patient Education:   [x]  HEP/Education Completed: Plan of Care, Goals, educated on stenosis and use of flexion preference for HEP at this time; avoiding hyperextension, posterior pelvic tilts and abdominal core engagement with proper breath technique with ex. [email or web address] Access Code:  []  No new Education completed  []  Reviewed Prior HEP      [x]  Patient verbalized and/or demonstrated understanding of education provided. []  Patient unable to verbalize and/or demonstrate understanding of education provided. Will continue education.   []  Barriers to learning:     PLAN:  Treatment Recommendations: Strengthening, Range of Motion, Functional Mobility Training, Transfer Training, Neuromuscular Re-education, Manual Therapy - Soft Tissue Mobilization, Pain Management, Home Exercise Program, Patient Education, Integrative Dry Needling, Aquatics, and Modalities    []  Plan of care initiated. Plan to see patient 2 times per week for 8 weeks to address the treatment planned outlined above.   [x]  Continue with current plan of care  []  Modify plan of care as follows:    []  Hold pending physician visit  []  Discharge    Time In 0902   Time Out 0947   Timed Code Minutes: 45   Total Treatment Time: 45     Electronically Signed by: Rei Purdy PT

## 2023-01-09 ENCOUNTER — HOSPITAL ENCOUNTER (OUTPATIENT)
Dept: PHYSICAL THERAPY | Age: 78
Setting detail: THERAPIES SERIES
Discharge: HOME | End: 2023-01-09
Payer: MEDICARE

## 2023-01-09 PROCEDURE — 97110 THERAPEUTIC EXERCISES: CPT

## 2023-01-09 PROCEDURE — 97140 MANUAL THERAPY 1/> REGIONS: CPT

## 2023-01-13 ENCOUNTER — HOSPITAL ENCOUNTER (OUTPATIENT)
Dept: PHYSICAL THERAPY | Age: 78
Setting detail: THERAPIES SERIES
Discharge: HOME | End: 2023-01-13
Payer: MEDICARE

## 2023-01-13 PROCEDURE — 97112 NEUROMUSCULAR REEDUCATION: CPT

## 2023-01-13 PROCEDURE — 97110 THERAPEUTIC EXERCISES: CPT

## 2023-01-13 NOTE — PROGRESS NOTES
[] EVALUATION  [x] DAILY NOTE (LAND) [] DAILY NOTE (AQUATIC ) [] PROGRESS NOTE [] DISCHARGE NOTE    [x] OUTPATIENT REHABILITATION CENTER [facility]   [] [place] [address]    [] [facility]   [] Dewayne Baltazar    Date: 2023  Patient Name:  Andres Deng  : 1945  MRN: [id number]  CSN: [id number]    Referring Practitioner TOO Duran*   Diagnosis Spinal stenosis of lumbar region without neurogenic claudication [M48.061]  Neuroforaminal stenosis of lumbosacral spine [M48.07]  Facet hypertrophy of lumbar region [M47.816]  Lumbar spondylosis [M47.816]  DDD (degenerative disc disease), lumbosacral [M51.37]  Lumbar radiculopathy [M54.16]  Chronic pain syndrome [G89.4]  Myofascial pain [M79.18]    Treatment Diagnosis Lumbar pain with radiculopathy LLE>RLE, with difficulty tolerating lying on back, functional mobility/activity   Date of Evaluation 22    Additional Pertinent History Osteopenia, history of previous PT appointments 11/10/2022 to 2022 for 4 visits for sciatica left side. Lateral stenosis and facet disorder. Was on hold and went to pain management physician. Functional Outcome Measure Used Modified Oswestry   Functional Outcome Score 16/50 (22)       Insurance: Primary: Payor: Carmen Pyle /  /  / ,   Secondary:    Authorization Information: PRE CERTIFICATION REQUIRED: NO  INSURANCE THERAPY BENEFIT:  NO VISIT LIMIT   AQUATIC THERAPY COVERED: YES  MODALITIES COVERED:  YES   TELEHEALTH COVERED:    REFERENCE NUMBER   Visit # 5,  5/10 for progress note   Visits Allowed: No visit limit   Recertification Date:    Physician Follow-Up: 2023   Physician Orders: Evaluation first available appointment, with dry needling   History of Present Illness: Patient reports of back pain that came on in October of this year. She has done a lot of heavy lifting and packing due to moving. She also was active with yardwork ie raking yard.  Notes pain pointing to lumbosacral region. She states that she did have sharp shooting pain down Left leg with rolling over in bed. Sharp pain in left leg goes to primarily 4th toe and some of her 5th toe. Burning feeling at toes. Notes pain also pointing to posterior lateral thigh. Some pain in left buttock but not too bad. The Right LE she just feels spasms or restless leg, pressure in her right calf. \"Veins are going to explode\" This sensation in her right calf , back and LLE symptoms are triggered with lying on her back. Notes most comfortable resting in recliner chair. Back pain is less with walking. Sitting does relieve symptoms somewhat. Frequent position changes also help. She takes hot showers to decrease her pain. Medication: Naproxen, Tramadol, Tylenol extra strength, muscle relaxant. Lidocaine patches not sure if helped. MRI and Xrays have been completed. Patient goals: to be painfree and be able to carry out normal activities with housework and gardening. SUBJECTIVE: Patient reports that her whole body has been flared up. She feels this may have been due to weather. Notes not as sore today. Pain level 4/10 left lumbar and buttock region. Left calf 2/10. Felt great following last session. Feels cupping really helps but does wear off after a while. Exercises going well without flaring up her back symptoms. TREATMENT   Precautions: Lumbar spondylosis, foraminal stenosis LLE calf symptoms   Pain: 2/10. Left calf and 4/10 left hip and back.      \"X in shaded column indicates activity completed today    *\" next to exercise/intervention indicates progression   Modalities Parameters/  Location  Notes                     Manual Therapy Time/Technique  Notes   Dry Needling - bilateral lumbar paraspinals (50 mm) 5 min  Tolerated well - assess response next visit and progress into gluteal region as tolerated   Myofascial decompression-dynamic cupping bilateral lumbar paraspinals 3 minutes gliding cups over paraspinals  Pronelying with pillow under abdomen and lower legs   Soft tissue mobilization manual therapy while pronelying left gluteal region and piriformis, glut medius  and light over bilateral paraspinals. 8 minutes  Pronelying with pillow under abdomen and lower legs   Exercise/Intervention   Notes   Logroll technique for bed mobility       Dry needling explanation and informational material issued       Decompression position in 90/90 for pain management    10 minutes prior to use of ex and use of MHP as needed    Explanation of foraminal stenosis, spondylosis, DDD, radiculopathy       SKTC and DKTC 3x 30 sec x SKTC with patient in 90/90 starting position   Piriformis stretch 3x 30 sec x    Hamstring stretch 3x 30 sec x Cues to stay within range allowing full knee extension   LTR* 10x 10 sec  Hold on rotations          Pelvic stability with abdominal engagement neutral pelvis  5x   Inhale release abdominal, exhale tense abdominals/core   Ankle dorsiflexion and eversion with right ankle with resistance band x10 Orange  x Seated in chair with heel propped on stool. Straight posture    Pelvic tilt proper breathing sequence 10x  x Exhale on posterior pelvic tilt inhale back to neutral pelvis   Marches 10x  x    Bent knee opening 10x Green band x    Femur arcs 10x  x           Book opening sidelying for thoracic articulation 5x  x           Wall partial squats back against wall 10x 5 count x    NK table knee flexion/extension RLE/LLE 10#/ 7.5# 2-3 count x    // bars bilateral heelraises 10x  x    Rockerboard fwd.back, side/side 10x Neutral 30 seconds x    6 inch step ups eccentric lowering with light touch of right hand on rail.  LLE only 10x  x    Nustep  seat 9  LEs only 5 minutes  x      Specific Interventions Next Treatment: dry needling, myofascial decompression-dynamic cupping paraspinals lumbar region, soft tissue mobilization, decompression position as needed 90/90 position, lumbar articulation in flexion and painfree extension, thoracic articulation avoiding flexion due to osteopenia. Core strengthening, body mechanics, LLE strengthening ankle and hip. Activity/Treatment Tolerance:  [x]  Patient tolerated treatment well  []  Patient limited by fatigue  []  Patient limited by pain   []  Patient limited by medical complications  []  Other:     Assessment:Progressed strengthening ex for core and LLE today. Use of NK table, // exercises with bilateral heelraises, rockerboard, and single 6 inch step up LLE with eccentric control. Progressed mat ex with unilateral bent knee opening with green band. Cuing for pelvic stability with the ex and abdominals up and lifted with the exercises. Added thoracic rotation/articulation with bent knee opening. Pain level 2/10 at calf, 0/10 back pain and left buttock 1/10. Goals    Patient Goal:   Patient goals: to be painfree and be able to carry out normal activities with housework and gardening. Short Term Goals: 4 weeks  Patient to report of pain levels 3-4/10 to no greater than 6/10 with pain managed at 2/10 in 4 weeks at lumbosacral region and centralization of LLE radiculopathy with abolished symptoms to 4th and 5th toes. Patient to demonstrate increased flexibility and lumbar articulation with trunk flexion, extension and thoracic mobility in extension from moderate restrictions to min restrictions at these segments. Patient to demonstrate increased strength at core musculature with progression in program with increased awareness of optimal lumbar neutral, pelvic stability and strength at abdominals/back muscles. Patient to demonstrate increased strength LLE at ankle plantarflexors, hip flexors, hip abductors, hip extensors 4-/5 to 5/5. Long Term Goals: 8 weeks  Modified Oswestry from 16/50 to 10/50  2.  Patient to demonstrate independence in a HEP with follow through of ex for core strength, LE strength, flexibility, body mechanics and decreased muscle tightness and pain management. Patient Education:   [x]  HEP/Education Completed:HEP issued through [address] [place] [address]. Ex covered in session on 1/9/2023 today. []  No new Education completed  []  Reviewed Prior HEP      [x]  Patient verbalized and/or demonstrated understanding of education provided. []  Patient unable to verbalize and/or demonstrate understanding of education provided. Will continue education. []  Barriers to learning:     PLAN:  Treatment Recommendations: Strengthening, Range of Motion, Functional Mobility Training, Transfer Training, Neuromuscular Re-education, Manual Therapy - Soft Tissue Mobilization, Pain Management, Home Exercise Program, Patient Education, Integrative Dry Needling, Aquatics, and Modalities    []  Plan of care initiated. Plan to see patient 2 times per week for 8 weeks to address the treatment planned outlined above.   [x]  Continue with current plan of care  []  Modify plan of care as follows:    []  Hold pending physician visit  []  Discharge    Time In 1016   Time Out 1116   Timed Code Minutes: 60    Total Treatment Time: 60     Electronically Signed by: Mike Jones PT

## 2023-01-16 ENCOUNTER — HOSPITAL ENCOUNTER (OUTPATIENT)
Dept: PHYSICAL THERAPY | Age: 78
Setting detail: THERAPIES SERIES
Discharge: HOME | End: 2023-01-16
Payer: MEDICARE

## 2023-01-16 PROCEDURE — 97110 THERAPEUTIC EXERCISES: CPT

## 2023-01-16 PROCEDURE — 97112 NEUROMUSCULAR REEDUCATION: CPT

## 2023-01-16 NOTE — PROGRESS NOTES
[] EVALUATION  [x] DAILY NOTE (LAND) [] DAILY NOTE (AQUATIC ) [] PROGRESS NOTE [] DISCHARGE NOTE    [x] OUTPATIENT REHABILITATION CENTER [facility]   [] [place] [address]    [] [facility]   [] Alberto Danvel    Date: 2023  Patient Name:  Nereida Herman  : 1945  MRN: [id number]  CSN: [id number]    Referring Practitioner TOO Padilla*   Diagnosis Spinal stenosis of lumbar region without neurogenic claudication [M48.061]  Neuroforaminal stenosis of lumbosacral spine [M48.07]  Facet hypertrophy of lumbar region [M47.816]  Lumbar spondylosis [M47.816]  DDD (degenerative disc disease), lumbosacral [M51.37]  Lumbar radiculopathy [M54.16]  Chronic pain syndrome [G89.4]  Myofascial pain [M79.18]    Treatment Diagnosis Lumbar pain with radiculopathy LLE>RLE, with difficulty tolerating lying on back, functional mobility/activity   Date of Evaluation 22    Additional Pertinent History Osteopenia, history of previous PT appointments 11/10/2022 to 2022 for 4 visits for sciatica left side. Lateral stenosis and facet disorder. Was on hold and went to pain management physician. Functional Outcome Measure Used Modified Oswestry   Functional Outcome Score 16/50 (22)       Insurance: Primary: Payor: Anselmo Cantor /  /  / ,   Secondary:    Authorization Information: PRE CERTIFICATION REQUIRED: NO  INSURANCE THERAPY BENEFIT:  NO VISIT LIMIT   AQUATIC THERAPY COVERED: YES  MODALITIES COVERED:  YES   TELEHEALTH COVERED:    REFERENCE NUMBER   Visit # 6,  6/10 for progress note   Visits Allowed: No visit limit   Recertification Date:    Physician Follow-Up: 2023   Physician Orders: Evaluation first available appointment, with dry needling   History of Present Illness: Patient reports of back pain that came on in October of this year. She has done a lot of heavy lifting and packing due to moving. She also was active with yardwork ie raking yard.  Notes pain pointing to lumbosacral region. She states that she did have sharp shooting pain down Left leg with rolling over in bed. Sharp pain in left leg goes to primarily 4th toe and some of her 5th toe. Burning feeling at toes. Notes pain also pointing to posterior lateral thigh. Some pain in left buttock but not too bad. The Right LE she just feels spasms or restless leg, pressure in her right calf. \"Veins are going to explode\" This sensation in her right calf , back and LLE symptoms are triggered with lying on her back. Notes most comfortable resting in recliner chair. Back pain is less with walking. Sitting does relieve symptoms somewhat. Frequent position changes also help. She takes hot showers to decrease her pain. Medication: Naproxen, Tramadol, Tylenol extra strength, muscle relaxant. Lidocaine patches not sure if helped. MRI and Xrays have been completed. Patient goals: to be painfree and be able to carry out normal activities with housework and gardening. SUBJECTIVE: Patient reports of tolerating Friday's PT session well. No increased pain. Was on her feet a lot over weekend and notes back more flared up Saturday, Sunday with difficulty sleeping due to pain at left buttock and left calf region. Patient to have pain management appointment for injection on 1/24/2023. Discussed with patient whether a Spine specialist in orthopedics had been set up yet if injections not helpful. TREATMENT   Precautions: Lumbar spondylosis, foraminal stenosis LLE calf symptoms   Pain: 3-4/10. Left calf and 5/10 left hip and back.      \"X in shaded column indicates activity completed today    *\" next to exercise/intervention indicates progression   Modalities Parameters/  Location  Notes                     Manual Therapy Time/Technique  Notes   Dry Needling - bilateral lumbar paraspinals (50 mm) 5 min  Tolerated well - assess response next visit and progress into gluteal region as tolerated   Myofascial decompression-dynamic cupping bilateral lumbar paraspinals 3 minutes gliding cups over paraspinals x Pronelying with pillow under abdomen and lower legs   Soft tissue mobilization manual therapy while pronelying left gluteal region and piriformis, glut medius  and light over bilateral paraspinals. Trigger point at glut medius and piriformis region/mid glut max  8 minutes    Trigger points held for 3-5 seconds and repeated 2x x Pronelying with pillow under abdomen and lower legs   Exercise/Intervention   Notes   Logroll technique for bed mobility       Dry needling explanation and informational material issued       Decompression position in 90/90 for pain management    10 minutes prior to use of ex and use of MHP as needed    Explanation of foraminal stenosis, spondylosis, DDD, radiculopathy       SKTC and DKTC 3x 30 sec x SKTC with patient in 90/90 starting position   Piriformis stretch 3x 30 sec x    Hamstring stretch 3x 30 sec x Cues to stay within range allowing full knee extension   LTR* 10x 10 sec  Hold on rotations          Pelvic stability with abdominal engagement neutral pelvis  5x  x Inhale release abdominal, exhale tense abdominals/core   Ankle dorsiflexion and eversion with right ankle with resistance band x10 Orange   Seated in chair with heel propped on stool. Straight posture    Pelvic tilt proper breathing sequence 10x  x Exhale on posterior pelvic tilt inhale back to neutral pelvis   Marches 10x  x    Bent knee opening 10x Green band     Femur arcs 10x  x           Book opening sidelying for thoracic articulation 5x  x           Wall partial squats back against wall 10x 5 count     NK table knee flexion/extension RLE/LLE 10#/ 7.5# 2-3 count     // bars bilateral heelraises 10x      Rockerboard fwd.back, side/side 10x Neutral 30 seconds     6 inch step ups eccentric lowering with light touch of right hand on rail.  LLE only 10x      Nustep  seat 9  LEs only 5 minutes        Specific Interventions Next Treatment: dry needling, myofascial decompression-dynamic cupping paraspinals lumbar region, soft tissue mobilization, decompression position as needed 90/90 position, lumbar articulation in flexion and painfree extension, thoracic articulation avoiding flexion due to osteopenia. Core strengthening, body mechanics, LLE strengthening ankle and hip. Activity/Treatment Tolerance:  [x]  Patient tolerated treatment well  []  Patient limited by fatigue  []  Patient limited by pain   []  Patient limited by medical complications  []  Other:     Assessment:Patient had been progressed in last session without difficulty. But over weekend back flared up so modified treatment program to RUST 90/90, flexibility, decompression and core work in [address]. Then completed dynamic cupping to paraspinals and left glut med, piriformis region deep tissue mobilization and trigger points manual therapy. Reports of 2/10 pain level at left low back and left buttock region. Less pain at calf to 1/2. Goals    Patient Goal:   Patient goals: to be painfree and be able to carry out normal activities with housework and gardening. Short Term Goals: 4 weeks  Patient to report of pain levels 3-4/10 to no greater than 6/10 with pain managed at 2/10 in 4 weeks at lumbosacral region and centralization of LLE radiculopathy with abolished symptoms to 4th and 5th toes. Patient to demonstrate increased flexibility and lumbar articulation with trunk flexion, extension and thoracic mobility in extension from moderate restrictions to min restrictions at these segments. Patient to demonstrate increased strength at core musculature with progression in program with increased awareness of optimal lumbar neutral, pelvic stability and strength at abdominals/back muscles. Patient to demonstrate increased strength LLE at ankle plantarflexors, hip flexors, hip abductors, hip extensors 4-/5 to 5/5.    Long Term Goals: 8 weeks  Modified Oswestry from 16/50 to 10/50  2. Patient to demonstrate independence in a HEP with follow through of ex for core strength, LE strength, flexibility, body mechanics and decreased muscle tightness and pain management. Patient Education:   [x]  HEP/Education Completed:HEP wall slides, stretches, ankle strengthening. 90/90 position resume    []  No new Education completed  []  Reviewed Prior HEP      [x]  Patient verbalized and/or demonstrated understanding of education provided. []  Patient unable to verbalize and/or demonstrate understanding of education provided. Will continue education. []  Barriers to learning:     PLAN:  Treatment Recommendations: Strengthening, Range of Motion, Functional Mobility Training, Transfer Training, Neuromuscular Re-education, Manual Therapy - Soft Tissue Mobilization, Pain Management, Home Exercise Program, Patient Education, Integrative Dry Needling, Aquatics, and Modalities    []  Plan of care initiated. Plan to see patient 2 times per week for 8 weeks to address the treatment planned outlined above.   [x]  Continue with current plan of care  []  Modify plan of care as follows:    []  Hold pending physician visit  []  Discharge    Time In 0900   Time Out 0948   Timed Code Minutes: 48   Total Treatment Time: 48     Electronically Signed by: Joan Pedraza PT

## 2023-01-18 ENCOUNTER — HOSPITAL ENCOUNTER (OUTPATIENT)
Dept: PHYSICAL THERAPY | Age: 78
Setting detail: THERAPIES SERIES
Discharge: HOME | End: 2023-01-18
Payer: MEDICARE

## 2023-01-18 PROCEDURE — 97110 THERAPEUTIC EXERCISES: CPT

## 2023-01-18 PROCEDURE — 97164 PT RE-EVAL EST PLAN CARE: CPT

## 2023-01-18 NOTE — PROGRESS NOTES
[] EVALUATION  [] DAILY NOTE (LAND) [] DAILY NOTE (AQUATIC ) [x] PROGRESS NOTE [] DISCHARGE NOTE    [x] OUTPATIENT REHABILITATION CENTER - [place]   [] [place] [address]    [] [facility]   [] Fariba Nelson    Date: 2023  Patient Name:  Mike Stearns  : 1945  MRN: [id number]  CSN: [id number]    Referring Practitioner TOO Ríos*   Diagnosis Spinal stenosis of lumbar region without neurogenic claudication [M48.061]  Neuroforaminal stenosis of lumbosacral spine [M48.07]  Facet hypertrophy of lumbar region [M47.816]  Lumbar spondylosis [M47.816]  DDD (degenerative disc disease), lumbosacral [M51.37]  Lumbar radiculopathy [M54.16]  Chronic pain syndrome [G89.4]  Myofascial pain [M79.18]    Treatment Diagnosis Lumbar pain with radiculopathy LLE>RLE, with difficulty tolerating lying on back, functional mobility/activity   Date of Evaluation 22    Additional Pertinent History Osteopenia, history of previous PT appointments 11/10/2022 to 2022 for 4 visits for sciatica left side. Lateral stenosis and facet disorder. Was on hold and went to pain management physician. Functional Outcome Measure Used Modified Oswestry   Functional Outcome Score 16/50 (22)       Insurance: Primary: Payor: Francis Nayak /  /  / ,   Secondary:    Authorization Information: PRE CERTIFICATION REQUIRED: NO  INSURANCE THERAPY BENEFIT:  NO VISIT LIMIT   AQUATIC THERAPY COVERED: YES  MODALITIES COVERED:  YES   TELEHEALTH COVERED:    REFERENCE NUMBER   Visit # 7,  0/10 for progress note   Visits Allowed: No visit limit   Recertification Date:    Physician Follow-Up: 2023   Physician Orders: Evaluation first available appointment, with dry needling   History of Present Illness: Patient reports of back pain that came on in October of this year. She has done a lot of heavy lifting and packing due to moving. She also was active with yardwork ie raking yard.  Notes pain pointing to lumbosacral region. She states that she did have sharp shooting pain down Left leg with rolling over in bed. Sharp pain in left leg goes to primarily 4th toe and some of her 5th toe. Burning feeling at toes. Notes pain also pointing to posterior lateral thigh. Some pain in left buttock but not too bad. The Right LE she just feels spasms or restless leg, pressure in her right calf. \"Veins are going to explode\" This sensation in her right calf , back and LLE symptoms are triggered with lying on her back. Notes most comfortable resting in recliner chair. Back pain is less with walking. Sitting does relieve symptoms somewhat. Frequent position changes also help. She takes hot showers to decrease her pain. Medication: Naproxen, Tramadol, Tylenol extra strength, muscle relaxant. Lidocaine patches not sure if helped. MRI and Xrays have been completed. Patient goals: to be painfree and be able to carry out normal activities with housework and gardening. SUBJECTIVE: Patient reports of a good day today. Noting less back pain with pain at left buttock region. Notes calf did not bother her too much last night and today. She did sleep with pillow under her knees and use of towel roll around her waist. She states that she slept really well last night with less pain and waking up. Patient feels pleased with progress in PT. Noting way less pain compared to when she initially started PT. Moving better. TREATMENT   Precautions: Lumbar spondylosis, foraminal stenosis LLE calf symptoms   Pain: 0/10. Left calf and 2/10 left hip and back.      \"X in shaded column indicates activity completed today    *\" next to exercise/intervention indicates progression   Modalities Parameters/  Location  Notes                     Manual Therapy Time/Technique  Notes   Dry Needling - bilateral lumbar paraspinals (50 mm) 5 min  Tolerated well - assess response next visit and progress into gluteal region as tolerated   Myofascial decompression-dynamic cupping bilateral lumbar paraspinals 3 minutes gliding cups over paraspinals  Pronelying with pillow under abdomen and lower legs   Soft tissue mobilization manual therapy while pronelying left gluteal region and piriformis, glut medius  and light over bilateral paraspinals. Trigger point at glut medius and piriformis region/mid glut max  8 minutes    Trigger points held for 3-5 seconds and repeated 2x  Pronelying with pillow under abdomen and lower legs   Exercise/Intervention   Notes   Logroll technique for bed mobility       Dry needling explanation and informational material issued       Decompression position in 90/90 for pain management    10 minutes prior to use of ex and use of MHP as needed    Explanation of foraminal stenosis, spondylosis, DDD, radiculopathy       SKTC and DKTC 3x 30 sec  SKTC with patient in 90/90 starting position   Piriformis stretch 3x 30 sec     Hamstring stretch 3x 30 sec  Cues to stay within range allowing full knee extension   LTR* 10x 10 sec  Hold on rotations          Pelvic stability with abdominal engagement neutral pelvis  5x   Inhale release abdominal, exhale tense abdominals/core   Ankle dorsiflexion and eversion with right ankle with resistance band x10 Orange   Seated in chair with heel propped on stool. Straight posture    Pelvic tilt proper breathing sequence 10x   Exhale on posterior pelvic tilt inhale back to neutral pelvis   Marches 10x      Bent knee opening 10x Green band     Femur arcs 10x             Book opening sidelying for thoracic articulation 5x             Wall partial squats back against wall 10x 5 count     NK table knee flexion/extension RLE/LLE 10#/ 7.5# 2-3 count x    // bars bilateral heelraises and single heelraises 10x  x    Rockerboard fwd.back, side/side 10x Neutral 30 seconds x    6 inch step ups eccentric lowering with light touch of right hand on rail.  LLE only 10x  x    Nustep  seat 9  LEs only 5 minutes      Reassessment 15 minutes  x      Specific Interventions Next Treatment: dry needling, myofascial decompression-dynamic cupping paraspinals lumbar region, soft tissue mobilization, decompression position as needed 90/90 position, lumbar articulation in flexion and painfree extension, thoracic articulation avoiding flexion due to osteopenia. Core strengthening, body mechanics, LLE strengthening ankle and hip. Activity/Treatment Tolerance:  [x]  Patient tolerated treatment well  []  Patient limited by fatigue  []  Patient limited by pain   []  Patient limited by medical complications  []  Other:     Assessment:Reassessment today. Patient continues to have decreased strength LLE at ankle hip regions. Lumbar flexion and extension are painfree today. Also noting progress with core work and ex for LLE strengthening. Notes less gaurding and walking better, bed mobility and transfers. Will continue PT to further address LLE strength, pain management of lumbar spine. Progress core strengthening. 1-2x per week. Goals    Patient Goal:   Patient goals: to be painfree and be able to carry out normal activities with housework and gardening. GOAL NOT MET Patient still protecting her back and not lifting anything over 10#s. Has been limiting bending and lifting. Heavy household chores. Short Term Goals: 4 weeks  Patient to report of pain levels 3-4/10 to no greater than 6/10 with pain managed at 2/10 in 4 weeks at lumbosacral region and centralization of LLE radiculopathy with abolished symptoms to 4th and 5th toes. GOAL MET 2/10 to 4/10 pain levels. Noting centralization of symptoms with no toe symptoms or calf symptoms today. Patient to demonstrate increased flexibility and lumbar articulation with trunk flexion, extension and thoracic mobility in extension from moderate restrictions to min restrictions at these segments. GOAL MET Note flexibility WNLS in flexion and thoracic mobility.    Patient to demonstrate increased strength at core musculature with progression in program with increased awareness of optimal lumbar neutral, pelvic stability and strength at abdominals/back muscles. GOAL IN PROGRESS, not met. Patient has been completing HEP and in clinic ex for core, hip and ankle strengthening. Continue to need progression. Patient to demonstrate increased strength LLE at ankle plantarflexors, hip flexors, hip abductors, hip extensors 4-/5 to 5/5. GOAL NOT MET hip flexors left 4-/5, left ankle plantarflexors and dorsiflexors 4-/5. Note evertors ankle and invertors 5/5. Long Term Goals: 8 weeks  Modified Oswestry from 16/50 to 10/50  Goal NOT MET Modified Oswestry 15/50. 2. Patient to demonstrate independence in a HEP with follow through of ex for core strength, LE strength, flexibility, body mechanics and decreased muscle tightness and pain management. GOAL NOT MET HEP in progress. Patient has been issued HEP but needs further progressed. Patient Education:   [x]  HEP/Education Completed:HEP wall slides, stretches, ankle strengthening. 90/90 position resume    []  No new Education completed  []  Reviewed Prior HEP      [x]  Patient verbalized and/or demonstrated understanding of education provided. []  Patient unable to verbalize and/or demonstrate understanding of education provided. Will continue education. []  Barriers to learning:     PLAN:  Treatment Recommendations: Strengthening, Range of Motion, Functional Mobility Training, Transfer Training, Neuromuscular Re-education, Manual Therapy - Soft Tissue Mobilization, Pain Management, Home Exercise Program, Patient Education, Integrative Dry Needling, Aquatics, and Modalities    []  Plan of care initiated. Plan to see patient 2 times per week for 8 weeks to address the treatment planned outlined above.   []  Continue with current plan of care  [x]  Modify plan of care as follows:  1-2x per week  []  Hold pending physician visit  []  Discharge    Time In 1105   Time Out 1150   Timed Code Minutes: 30   Total Treatment Time: 45     Electronically Signed by: Elmo Joseph PT

## 2023-01-20 ENCOUNTER — PREP FOR PROCEDURE (OUTPATIENT)
Dept: PHYSICAL MEDICINE AND REHAB | Age: 78
End: 2023-01-20

## 2023-01-20 NOTE — DISCHARGE INSTRUCTIONS
Post procedure Instructions:    No driving or making significant decisions for the remainder of the day. Be cautions with walking and activity for 24 hours, do not over exert yourself. Ok to resume pre-procedure activity level today. Apply ice to site of injection site if you have pain or discomfort. Do not submerge sit of injection during bath or pool activities for 48 hours post-procedure. Resume blood thinning medications in 24 hours. Call office [phone] if you have:  Temperature greater than 100.4  Persistent nausea and vomiting  Severe uncontrolled pain  Redness, tenderness, or signs of infection (pain, swelling, redness, odor or green/yellow discharge around the site)  Difficulty breathing, headache or visual disturbances  Hives  Persistent dizziness or light-headedness  Extreme fatigue  Any other questions or concerns you may have after discharge    In an emergency, call 911 or go to an Emergency Department at a nearby hospital    Surgical Site Infections      How can we work together to prevent Surgical Site Infections? We would like to thank you for choosing [facility] for your Surgical Care. Below you will find helpful information on how we can work together to prevent Surgical Site Infections. What is a Surgical Site Infection (SSI)? A surgical site infection is an infection that occurs after surgery in the part of the body where the surgery took place. Most patients who have surgery do not develop an infection. However, infections develop in about 1 to 3 out of every 100 patients who have surgery. Some of the common symptoms of a surgical site infection are:  Redness and pain around the area where you had surgery  Drainage of cloudy fluid from your surgical wound  Fever    Can SSIs be treated? Yes. Most surgical site infections can be treated with antibiotics. The antibiotic given to you depends on the bacteria (germs) causing the infection.  Sometimes patients with SSIs also need another surgery to treat the infection. What are some of the things that hospitals are doing to prevent SSIs? To prevent SSIs, doctors, nurses, and other healthcare providers: May remove some of your hair immediately before your surgery using electric clippers if the hair is in the same area where the procedure will occur. They should not shave you with a razor. Give you antibiotics before your surgery starts. In most cases, you should get antibiotics within 60 minutes before the surgery starts and the antibiotics should be stopped within 24 hours after surgery. Clean the skin at the site of your surgery with a special soap that kills germs. Clean their hands and arms up to their elbows with an antiseptic agent just before the surgery. Wear special hair covers, masks, gowns, and gloves during surgery to  keep the surgery area clean. Clean their hands with soap and water or an alcohol-based hand rub before and after caring for each patient. If you do not see your providers clean their hands, please     ask  them to do so. What can I do to help prevent SSIs? Before your surgery:  Tell your doctor about other medical problems you may have. Health problems such as allergies, diabetes, and obesity could affect your surgery and your treatment. Quit smoking. Patients who smoke get more infections. Talk to your doctor about how you can quit before your surgery. Do not shave near where you will have surgery. Shaving with a razor can irritate your skin and make it easier to develop an infection. At the time of your surgery:  Speak up if someone tries to shave you with a razor before surgery. Ask why you need to be shaved and talk with your surgeon if you have any concerns. Ask if you will get antibiotics before surgery.   After your surgery:  Make sure that your healthcare providers clean their hands before examining you, either with soap and water or an alcohol-based hand rub.  Family and friends who visit you should not touch the surgical wound or dressings. Family and friends should clean their hands with soap and water or an alcohol-based hand rub before and after visiting you. If you do not see them clean their hands, ask them to clean their hands. What do I need to do when I go home from the hospital?  Before you go home, your doctor or nurse should explain everything you need to know about taking care of your wound. Make sure you understand how to care for your wound before you leave the hospital.  Always clean your hands before and after caring for your wound. Before you go home, make sure you know who to contact if you have questions or problems after you get home. If you have any symptoms of an infection, such as redness and pain at the surgery site, drainage, or fever, call your doctor immediately. If you have additional questions, please ask your doctor or nurse.

## 2023-01-23 NOTE — H&P
Today, patient presents for planned lumbar epidural steroid injection approach at L4-5    This note is reflective of the patient's previous visit for evaluation. We will proceed with today's planned procedure. Since patient's last visit for evaluation, there have been no interval changes in medical history. Patient has no new numbness, weakness, or focal neurological deficit since evaluation. Patient has no contraindications to injection (no anticoagulation or recent antibiotic intake for active infections), and has a  present or is able to drive themselves (as discussed and cleared by physician). Allergies to latex, contrast dye, and steroid medications have been confirmed with the patient prior to the procedure. NPO necessity has been assessed and accepted based on procedure complexity. The risks and benefits of the procedure have been explained including but are not limited to infection, bleeding, paralysis, immediate post procedure weakness, and dizziness; the patient acknowledges understanding and desires to proceed with the procedure. Patient has signed consent for same procedure as discussed in previous clinic encounter. All other questions and concerns were addressed at bedside. See procedure note for full details. Post procedure Instructions: The patient was advised not to drive during the day of the procedure and not to engage in any significant decision making (unless otherwise states by physician). The patient was also advised to be cautious with walking/activity for 24 hours following today's visit and asked not to engage in over-exertion (unless otherwise states by physician). After this time, it is ok to resume pre-procedure level of activity. Patient advised to apply ice to site of injection in situations of pain and discomfort. Patient advised to not submerge site of injection during bath or pool activities for approximately 24 hours post-procedure.  Patient attested to understanding post procedure directions / restrictions. All other questions and concerns addressed before patient discharge in ambulatory fashion. Chronic Pain/PM&R Clinic Note     Encounter Date: 12/12/22    Subjective:   Chief Complaint:   No chief complaint on file. History of Present Illness:   Dennis Quintero is a 69 y.o. female seen in the clinic initially on 12/12/2022 upon request from SIDDHARTHA Daniels for her history of low back pain. She has a personal medical history of osteopenia, situational stress, insomnia. She reports that she also discussed for years ago, has been doing more things when house, lifting and moving things, as well as she had recently moved    Patient presents with complaints of bilateral low back pain, does go into the left buttocks and down the left leg to her toes at times. She also reports she has bilateral calf pain that is worse at night. She states this pain has been ongoing for about 3 months or more. She states that she was working in the yard, lifting pavers, raking, and started have some pain. She states it has just been worsening over time. She describes her calf pain is restless, cramping, charley horses, worse at night. She describes the low back and left buttocks pain as a constant ache, and when she does get the left leg symptoms feels like a burning, shocking. She denies any numbness, loss of bowel or bladder control. She reports nothing in particular makes the pain better or worse. She states certain movements or exercises that she did in therapy would exacerbate it. She has tried ice, heat, massage with no relief. She was in physical therapy, stated some movements felt like it helped her pain, but the mother made the pain worse. She states this is old to see what our plan was. She reports she is not sleeping well, pain will wake her up throughout the night, keep her up at night.   She states when the pain is flared up really bad, she feels a little weakness in her left leg, will use a cane or hold onto things if she needs to but not time. She states she has not had any falls. She does report she is sleeping in a recliner as she cannot lay flat or sleep very well at night. She states she has been getting steroid injections at the ED occasionally, most recent was 12/10/2022. She states it helps for about a week and then pain comes back. Pain scale : at worst pain is 10/10, at best pain is 2/10 after steroid injection at ED. History of Interventions:   Surgery: No previous lumbar surgeries  Injections: None    Current Treatment Medications:   Naproxen - mild relief  Voltaren- - no relief  Flexeril - helps some but makes her groggy   Tylenol - no relief     Historical Treatment Medications:   Tramadol - mild relief   Lidocaine patches - no relief    Imaging:  Lumbar x-ray 11/5/2022  PROCEDURE: XR LUMBAR SPINE (2-3 VIEWS)       CLINICAL INFORMATION: 50-year-old female with left lower back pain for 3 weeks. COMPARISON: No prior study. TECHNIQUE: AP and lateral views of the lumbar spine. FINDINGS:       There are 5 nonrib-bearing lumbar vertebral bodies. The lumbar vertebral body heights and AP alignment are preserved. There is mild dextroconvex curvature of the lumbar spine. There is generalized osteopenia. There is disc space narrowing at L1-L2. Facet arthropathy is present in the lower lumbar spine. Atherosclerotic calcifications through the abdominal aorta. There is narrowing of the sacroiliac joints. Impression   Degenerative changes with no acute fracture or subluxation. MRI lumbar spine 11/29/2022  PROCEDURE: MRI LUMBAR SPINE WO CONTRAST       CLINICAL INFORMATION: Pain of left lower extremity, Left sided sciatica. Low back pain radiating down both legs. No known injury. COMPARISON: Lumbar spine radiographs dated 11/5/2022.        TECHNIQUE: Sagittal and axial T1 and T2-weighted images were obtained through the lumbar spine. FINDINGS:   There is a mild dextrocurvature of the lumbar spine, stable compared to prior radiographs. There is otherwise anatomic vertebral body height and alignment. There is disc space narrowing throughout the lumbar spine most pronounced at L1-2 and with    relative sparing at L5-S1, similar to prior radiographs. Marrow signal is within normal limits. The conus terminates in a normal fashion at the L1 level. No cauda equina nerve root thickening or clumping is identified. Paraspinal soft tissues are    unremarkable. At T12-L1 there is no significant spinal canal or neuroforaminal stenosis. At L1-2 there is a shallow disc bulge which mildly indents thecal sac and may contact traversing L2 nerve roots. There is mild to moderate right and moderate left neural foraminal stenosis in association with facet hypertrophy and ligamentum flavum    thickening. At L2-3 there is a shallow disc bulge which mildly indents thecal sac but does not contact traversing nerve roots. There is mild to moderate right and moderate left neural foraminal stenosis in association with facet hypertrophy and ligamentum flavum    thickening. At L3-4 there is a shallow disc bulge which mildly indents thecal sac and may contact traversing L4 nerve roots bilaterally. There is mild to moderate bilateral foraminal stenosis in association with facet hypertrophy and ligamentum flavum thickening. At L4-5 there is a shallow disc bulge which mildly indents thecal sac but does not contact traversing nerve roots. There is mild bilateral neural foraminal stenosis in association with facet hypertrophy and ligamentum flavum thickening. At L5-S1 is no significant spinal canal stenosis. There is mild bilateral neural foraminal stenosis in association with facet hypertrophy and ligamentum flavum thickening.            Impression    Multilevel degenerative changes with areas of mild spinal canal stenosis at the L1-2 and L4-5 levels and up to moderate neuroforaminal stenosis at L1-2 and L2-3.       No past medical history on file.    Past Surgical History:   Procedure Laterality Date    CARPAL TUNNEL RELEASE  1999    HYSTERECTOMY (CERVIX STATUS UNKNOWN)  1994    TONSILLECTOMY  1963       Family History   Problem Relation Age of Onset    Stroke Mother     Heart Attack Father     Breast Cancer Sister          Medications & Allergies:   Current Outpatient Medications   Medication Instructions    cyclobenzaprine (FLEXERIL) 10 mg, Oral, 3 TIMES DAILY PRN    diclofenac sodium (VOLTAREN) 2 g, Topical, 4 TIMES DAILY PRN    naproxen (NAPROSYN) 500 mg, Oral, 2 TIMES DAILY PRN    sertraline (ZOLOFT) 50 mg, Oral, DAILY    terbinafine (LAMISIL) 250 MG tablet take 1 tablet by mouth once daily    terbinafine (LAMISIL) 250 mg, Oral, DAILY       Allergies   Allergen Reactions    Demerol [Meperidine Hcl] Nausea And Vomiting       Review of Systems:   Constitutional: negative for weight changes or fevers  Genitourinary: negative for bowel/bladder incontinence   Musculoskeletal: positive for low back pain, left SI pain  Neurological: positive for left leg weakness, negative for numbness/tingling  Behavioral/Psych: negative for anxiety/depression   All other systems reviewed and are negative    Objective:     There were no vitals filed for this visit.      Constitutional: Pleasant, no acute distress   Head: Normocephalic, atraumatic   Eyes: Conjunctivae normal   Neck: Supple, symmetrical   Lungs: Normal respiratory effort, non-labored breathing   Cardiovascular: Limbs warm and well perfused   Abdomen: Non-protruded   Musculoskeletal: Muscle bulk symmetric, no atrophy, no gross deformities    · Lumbar Spine: ROM WNL. Lumbar paraspinals non-tender to palpation bilaterally. SLR positive bilaterally. JUAN neg bilaterally. GAENSLEN neg bilaterally. Negative facet loading bilaterally. Bilateral SI joints non-tender to palpation. SI Distraction maneuver  negative on left/right side. Bilateral greater trochanters non-tender to palpation. Neurological: Cranial nerves II-XII grossly intact. · Gait - Normal, non-antalgic gait. Ambulates without assistive device. · Motor: 5/5 muscle strength in bilateral hip flexion, knee flexion, knee extension, ankle dorsiflexion, and ankle plantar flexion   · Sensory: LT sensation intact in lower limbs   Skin: No rashes or lesions present   Psychological: Cooperative, no exaggerated pain behaviors     Assessment:    Diagnosis Orders   1. Spinal stenosis of lumbar region without neurogenic claudication        2. Neuroforaminal stenosis of lumbosacral spine        3. Facet hypertrophy of lumbar region        4. Lumbar spondylosis        5. DDD (degenerative disc disease), lumbosacral        6. Chronic pain syndrome             Mikhail Davila is a 69 y. o.female presenting to the pain clinic for evaluation of low back pain with left leg symptoms, and bilateral calf pain. Did discuss with her procedures to assist with pain control such as; lumbar epidural steroid injection at L4-5, transforaminal lumbar epidural steroid injections, lumbar facet series, SI joint injection series. With her complaints we will start with a lumbar epidural steroid injection at L4-5. Did discuss with her have to be cautious with steroids that she has had recent steroid injection from the emergency department, and that we need to not utilize those in order to get a procedure with us using steroid. At this time I have set her up with tramadol 50 mg twice daily. Discussed that she can continue naproxen, Tylenol, ice, heat, stretches as well assist with pain. I will see her back after procedures      Plan: The following treatment recommendations and plan were discussed in detail with Mikhail Davila. Imaging:   I have reviewed patients imaging of lumbar MRI, lumbar x-ray and results were discussed with patient today. Analgesics:    The patient is currently managing their pain with the use of Naproxen which is prescribed by PCP.     We recommend that the patient follow the recommendation of the prescribing provider with regard to the above medication(s) and contact their prescribing provider for refills if needed.   The side effect profile of long-term opioid use was discussed and recommended emphasis on multimodal strategies for pain management.     Patient is taking Acetaminophen. Patient informed that the maximum amount of acetaminophen taken on a regular basis should only be 4000 mg per day.     Patient is taking Naproxen. Patient is advised to take as prescribed and not take on an empty stomach.     Adjuvants:   For continued chronic pain with associated musculoskeletal component, the patient is advised to start Tramadol 50 mg BID PRN for severe pain(7-10/10).  Educated on side effects, addiction, tolerance, safe medication storage, office policy refill request    OARRS reviewed, pain contract agreement signed    Interventions:   In presence of lumbosacral radiating pain, the option of lumbar epidural steroid injection approach at L4-5 was chosen. The risks and benefits were discussed in detail with the patient. Patient wants to proceed with the injection with Dr Christianson     Anticoagulation/NPO Recommendations:   Patient does need to hold any medications prior to the procedure - NSAIDs   Patient will need to be NPO x 8 hours prior to the procedure.  We will start an IV prior to the procedure    Multidisciplinary Pain Management:   In the presence of complex, chronic, and multi-factorial pain, the importance of a multidisciplinary approach to pain management in the patient’s management regimen was emphasized and discussed in great detail.   PHYSICAL THERAPY: Patient is advised to see a physical therapist for gentle stretching exercises and conditioning exercises for management of pain.   The patient was also advised to continue physical therapy and  stretching exercises at home and cognitive behavioral and/or group therapy. Referrals:      Prescriptions Written This Visit:   Tramadol 50 mg BID PRN    Follow-up:   After procedures     It was my pleasure to evaluate Zuly Espitia today. I spent over 60 minutes evaluating this patient, reviewing previous notes and images and completing documentation.        Ryan Wang, DO   Interventional Pain Management/PM&R   [place]

## 2023-01-24 ENCOUNTER — HOSPITAL ENCOUNTER (OUTPATIENT)
Age: 78
Setting detail: OUTPATIENT SURGERY
Discharge: HOME | End: 2023-01-24
Attending: ANESTHESIOLOGY | Admitting: ANESTHESIOLOGY
Payer: MEDICARE

## 2023-01-24 ENCOUNTER — APPOINTMENT (OUTPATIENT)
Dept: GENERAL RADIOLOGY | Age: 78
End: 2023-01-24
Attending: ANESTHESIOLOGY
Payer: MEDICARE

## 2023-01-24 VITALS
RESPIRATION RATE: 16 BRPM | HEIGHT: 68 IN | DIASTOLIC BLOOD PRESSURE: 66 MMHG | TEMPERATURE: 97.8 F | BODY MASS INDEX: 23.22 KG/M2 | HEART RATE: 67 BPM | OXYGEN SATURATION: 98 % | SYSTOLIC BLOOD PRESSURE: 140 MMHG | WEIGHT: 153.2 LBS

## 2023-01-24 PROCEDURE — 6360000004 HC RX CONTRAST MEDICATION: Performed by: ANESTHESIOLOGY

## 2023-01-24 PROCEDURE — 6360000002 HC RX W HCPCS: Performed by: ANESTHESIOLOGY

## 2023-01-24 PROCEDURE — 2709999900 HC NON-CHARGEABLE SUPPLY: Performed by: ANESTHESIOLOGY

## 2023-01-24 PROCEDURE — 3209999900 FLUORO FOR SURGICAL PROCEDURES

## 2023-01-24 PROCEDURE — 7100000010 HC PHASE II RECOVERY - FIRST 15 MIN: Performed by: ANESTHESIOLOGY

## 2023-01-24 PROCEDURE — 7100000011 HC PHASE II RECOVERY - ADDTL 15 MIN: Performed by: ANESTHESIOLOGY

## 2023-01-24 PROCEDURE — 2500000003 HC RX 250 WO HCPCS: Performed by: ANESTHESIOLOGY

## 2023-01-24 PROCEDURE — 3600000054 HC PAIN LEVEL 3 BASE: Performed by: ANESTHESIOLOGY

## 2023-01-24 PROCEDURE — 99152 MOD SED SAME PHYS/QHP 5/>YRS: CPT | Performed by: ANESTHESIOLOGY

## 2023-01-24 RX ORDER — DEXAMETHASONE SODIUM PHOSPHATE 4 MG/ML
INJECTION, SOLUTION INTRA-ARTICULAR; INTRALESIONAL; INTRAMUSCULAR; INTRAVENOUS; SOFT TISSUE PRN
Status: DISCONTINUED | OUTPATIENT
Start: 2023-01-24 | End: 2023-01-24 | Stop reason: ALTCHOICE

## 2023-01-24 RX ORDER — BACTERIOSTATIC SODIUM CHLORIDE 0.9 %
VIAL (ML) INJECTION PRN
Status: DISCONTINUED | OUTPATIENT
Start: 2023-01-24 | End: 2023-01-24 | Stop reason: ALTCHOICE

## 2023-01-24 RX ORDER — MIDAZOLAM HYDROCHLORIDE 1 MG/ML
INJECTION INTRAMUSCULAR; INTRAVENOUS PRN
Status: DISCONTINUED | OUTPATIENT
Start: 2023-01-24 | End: 2023-01-24 | Stop reason: ALTCHOICE

## 2023-01-24 RX ORDER — FENTANYL CITRATE 50 UG/ML
INJECTION, SOLUTION INTRAMUSCULAR; INTRAVENOUS PRN
Status: DISCONTINUED | OUTPATIENT
Start: 2023-01-24 | End: 2023-01-24 | Stop reason: ALTCHOICE

## 2023-01-24 RX ORDER — TRAMADOL HYDROCHLORIDE 50 MG/1
50 TABLET ORAL EVERY 8 HOURS PRN
COMMUNITY

## 2023-01-24 RX ORDER — LIDOCAINE HYDROCHLORIDE 10 MG/ML
INJECTION, SOLUTION EPIDURAL; INFILTRATION; INTRACAUDAL; PERINEURAL PRN
Status: DISCONTINUED | OUTPATIENT
Start: 2023-01-24 | End: 2023-01-24 | Stop reason: ALTCHOICE

## 2023-01-24 ASSESSMENT — PAIN DESCRIPTION - LOCATION: LOCATION: LEG

## 2023-01-24 ASSESSMENT — PAIN DESCRIPTION - DESCRIPTORS: DESCRIPTORS: ACHING

## 2023-01-24 ASSESSMENT — PAIN - FUNCTIONAL ASSESSMENT: PAIN_FUNCTIONAL_ASSESSMENT: 0-10

## 2023-01-24 ASSESSMENT — PAIN SCALES - GENERAL: PAINLEVEL_OUTOF10: 1

## 2023-01-24 ASSESSMENT — PAIN DESCRIPTION - ORIENTATION: ORIENTATION: LEFT

## 2023-01-24 NOTE — PROGRESS NOTES
1026 Pt to phase 2 recovery per cart. Pt awake and alert. States Left calf pain \" 1\". Report from [facility]. No bleeding from injection site. 1030 Pt taking offered snack. 1040 Pt dressing on cart with side rails up. 65 Pt discharged ambulatory with staff to car with . Pt stable and gait steady. Denies complaints.

## 2023-01-24 NOTE — POST SEDATION
[facility]  Sedation/Analgesia Post Sedation Record    Pt Name: Dewayne Ashby  MRN: [id number]  YOB: 1945  Procedure Performed By: Christen Lanes, DO  Primary Care Physician: Anastasia Alan MD    POST-PROCEDURE    Physicians/Assistants: Christen Lanes, DO  Procedure Performed: See Procedure Note   Sedation/Anesthesia: Versed and Fentanyl (See procedure note for amount and duration)  Estimated Blood Loss:     0  ml  Specimens Removed: None        Complications: None           Ryan Mcmillan DO  Electronically signed 1/24/2023 at 12:41 PM

## 2023-01-24 NOTE — PROCEDURES
Pre-operative Diagnosis: Radicular leg pain     Post-operative Diagnosis: Radicular leg pain     Procedure: Lumbar epidural steroid injection    Procedure Description:  After having obtained a signed informed consent, the patient was taken to the fluoroscopy suite and placed in the prone position. The patient's back was prepped with chloraprep and draped in a sterile fashion. A total of 1.5 cc of 1 % lidocaine was used to anesthetize the skin and underlying tissues. Under fluoroscopic guidance, a single 20G Tuohy needle was advanced using midline approach at the L4/L5 interspace until gaining the epidural space using the loss of resistance to saline syringe technique. There were no paresthesias, heme, or CSF aspiration. A total of 0.25 cc of Omnipaque 300 were injected having had adequate dye spread within the epidural space. Needle placement and contrast spread was confirmed using the AP and contralateral views. 10 mg of Dexamethasone with 1 cc of saline solution were injected in the epidural space. The needle was flushed and removed without any complication. The patient tolerated the procedure well and was transported to the recovery room. The patient was observed for 15 minutes to then discharged in an ambulatory fashion.     Procedural Complications: None  Estimated Blood Loss: 0 mL      IV sedation was used during the procedure:  - Moderate intravenous conscious sedation was supervised by Dr. Belia Lanza  - The patient was independently monitored by a Registered Nurse assigned to the procedure room  - Monitoring included automated blood pressure, continuous EKG, and continuous pulse oximetry  - The detailed conscious record is permanently stored in the [place] [id number]  - The following is the conscious sedation record:  Start Time: 10:18  End Time : 10:33  Duration: 15 minutes   Medications Administered: 1 mg Versed, 50 mcg Fentanyl       Ryan Christianson DO  Interventional Pain Management/PM&R [facility]

## 2023-01-24 NOTE — PRE SEDATION
[id number] [id number]  Pre-Sedation/Analgesia History & Physical    Pt Name: Jerson Dial  MRN: [id number]  YOB: 1945  Provider Performing Procedure: Mahogany Berg DO   Primary Care Physician: Leda Rojas MD      MEDICAL HISTORY       has a past medical history of Anxiety. SURGICAL HISTORY   has a past surgical history that includes Hysterectomy (1994); Tonsillectomy (1963); and Carpal tunnel release (1999). ALLERGIES   Allergies as of 01/04/2023 - Fully Reviewed 12/12/2022   Allergen Reaction Noted    Demerol [meperidine hcl] Nausea And Vomiting 10/14/2022       MEDICATIONS   Prior to Admission medications    Medication Sig Start Date End Date Taking? Authorizing Provider   traMADol (ULTRAM) 50 MG tablet Take 50 mg by mouth every 8 hours as needed for Pain.    Yes Historical Provider, MD   Acetaminophen (TYLENOL EXTRA STRENGTH PO) Take 2 tablets by mouth every 6 hours as needed   Yes Historical Provider, MD   sertraline (ZOLOFT) 50 MG tablet Take 50 mg by mouth daily    Historical Provider, MD   terbinafine (LAMISIL) 250 MG tablet Take 250 mg by mouth daily  Patient not taking: Reported on 1/24/2023    Historical Provider, MD   cyclobenzaprine (FLEXERIL) 10 MG tablet Take 10 mg by mouth 3 times daily as needed    Historical Provider, MD   naproxen (NAPROSYN) 500 MG tablet Take 1 tablet by mouth 2 times daily as needed for Pain 12/1/22   SIDDHARTHA Ontiveros - CNP   terbinafine (LAMISIL) 250 MG tablet take 1 tablet by mouth once daily  Patient not taking: Reported on 1/24/2023 9/12/22   Historical Provider, MD   diclofenac sodium (VOLTAREN) 1 % GEL Apply 2 g topically 4 times daily as needed for Pain 7/8/22   Leda Rojas MD     PHYSICAL:   Vitals:    01/24/23 1034   BP: (!) 140/66   Pulse: 67   Resp: 16   Temp: 97.8 °F (36.6 °C)   SpO2: 98%     PLANNED PROCEDURE   See procedure note  SEDATION  Planned agent: Versed and Fentanyl  ASA Classification: 2  Class 1: A normal healthy patient  Class 2: Pt with mild to moderate systemic disease  Class 3: Severe systemic disease or disturbance  Class 4: Severe systemic disorders that are already life threatening. Class 5: Moribund pt with little chances of survival, for more than 24 hours. Mallampati I Airway Classification: 2    1. Pre-procedure diagnostic studies complete and results available. 2. Previous sedation/anesthesia experiences assessed. 3. The patient is an appropriate candidate to undergo the planned procedure sedation and anesthesia. (Refer to nursing sedation/analgesia documentation record)  4. Formulation and discussion of sedation/procedure plan, risks, and expectations with patient and/or responsible adult completed. 5. Patient examined immediately prior to the procedure.  (Refer to nursing sedation/analgesia documentation record)    Chano Stearns DO  Electronically signed 1/24/2023 at 12:40 PM

## 2023-01-27 ENCOUNTER — HOSPITAL ENCOUNTER (OUTPATIENT)
Dept: PHYSICAL THERAPY | Age: 78
Setting detail: THERAPIES SERIES
Discharge: HOME | End: 2023-01-27
Payer: MEDICARE

## 2023-01-27 PROCEDURE — 97110 THERAPEUTIC EXERCISES: CPT

## 2023-01-27 NOTE — PROGRESS NOTES
[] EVALUATION  [x] DAILY NOTE (LAND) [] DAILY NOTE (AQUATIC ) [] PROGRESS NOTE [] DISCHARGE NOTE    [x] OUTPATIENT REHABIL[facility] [place]   [] [facility]    [] [facility]   [] Rolando Kelley    Date: 2023  Patient Name:  Melvin Rogers  : 1945  MRN: [id number]  CSN: [id number]    Referring Practitioner TOO Conrad*   Diagnosis Spinal stenosis of lumbar region without neurogenic claudication [M48.061]  Neuroforaminal stenosis of lumbosacral spine [M48.07]  Facet hypertrophy of lumbar region [M47.816]  Lumbar spondylosis [M47.816]  DDD (degenerative disc disease), lumbosacral [M51.37]  Lumbar radiculopathy [M54.16]  Chronic pain syndrome [G89.4]  Myofascial pain [M79.18]    Treatment Diagnosis Lumbar pain with radiculopathy LLE>RLE, with difficulty tolerating lying on back, functional mobility/activity   Date of Evaluation 22    Additional Pertinent History Osteopenia, history of previous PT appointments 11/10/2022 to 2022 for 4 visits for sciatica left side. Lateral stenosis and facet disorder. Was on hold and went to pain management physician. Functional Outcome Measure Used Modified Oswestry   Functional Outcome Score 16/50 (22)       Insurance: Primary: Payor: Kevin Cano /  /  / ,   Secondary:    Authorization Information: PRE CERTIFICATION REQUIRED: NO  INSURANCE THERAPY BENEFIT:  NO VISIT LIMIT   AQUATIC THERAPY COVERED: YES  MODALITIES COVERED:  YES   TELEHEALTH COVERED:    REFERENCE NUMBER   Visit # 7,  1/10 for progress note   Visits Allowed: No visit limit   Recertification Date: 3/42/3793   Physician Follow-Up: 2023   Physician Orders: Evaluation first available appointment, with dry needling   History of Present Illness: Patient reports of back pain that came on in October of this year. She has done a lot of heavy lifting and packing due to moving. She also was active with yardwork ie raking yard.  Notes pain pointing to lumbosacral region. She states that she did have sharp shooting pain down Left leg with rolling over in bed. Sharp pain in left leg goes to primarily 4th toe and some of her 5th toe. Burning feeling at toes. Notes pain also pointing to posterior lateral thigh. Some pain in left buttock but not too bad. The Right LE she just feels spasms or restless leg, pressure in her right calf. \"Veins are going to explode\" This sensation in her right calf , back and LLE symptoms are triggered with lying on her back. Notes most comfortable resting in recliner chair. Back pain is less with walking. Sitting does relieve symptoms somewhat. Frequent position changes also help. She takes hot showers to decrease her pain. Medication: Naproxen, Tramadol, Tylenol extra strength, muscle relaxant. Lidocaine patches not sure if helped. MRI and Xrays have been completed. Patient goals: to be painfree and be able to carry out normal activities with housework and gardening. SUBJECTIVE: Pt arrives reporting she had an epidural on Tuesday and has been feeling much better since. Has been able to tolerate her exercises and stretches better at home. Does occasionally get some \"numbness\" across L foot but this too has been getting better.        TREATMENT   Precautions: Lumbar spondylosis, foraminal stenosis LLE calf symptoms   Pain: Denies any pain, reports \"soreness\" in low back     \"X in shaded column indicates activity completed today    *\" next to exercise/intervention indicates progression   Modalities Parameters/  Location  Notes                     Manual Therapy Time/Technique  Notes   Dry Needling - bilateral lumbar paraspinals (50 mm) 5 min  Tolerated well - assess response next visit and progress into gluteal region as tolerated   Myofascial decompression-dynamic cupping bilateral lumbar paraspinals 3 minutes gliding cups over paraspinals  Pronelying with pillow under abdomen and lower legs   Soft tissue mobilization manual therapy while pronelying left gluteal region and piriformis, glut medius  and light over bilateral paraspinals. Trigger point at glut medius and piriformis region/mid glut max  8 minutes    Trigger points held for 3-5 seconds and repeated 2x x Pronelying with pillow under abdomen and lower legs    Pt reporting this is much less tender today   Exercise/Intervention   Notes   Logroll technique for bed mobility       Dry needling explanation and informational material issued       Decompression position in 90/90 for pain management    10 minutes prior to use of ex and use of MHP as needed    Explanation of foraminal stenosis, spondylosis, DDD, radiculopathy       SKTC and DKTC 3x 30 sec x SKTC with patient in 90/90 starting position   Piriformis stretch 3x 30 sec x    Hamstring stretch 3x 30 sec x Cues to stay within range allowing full knee extension   LTR* 10x 10 sec  Hold on rotations          Pelvic stability with abdominal engagement neutral pelvis  5x   Inhale release abdominal, exhale tense abdominals/core   Ankle dorsiflexion and eversion with right ankle with resistance band x10 Orange   Seated in chair with heel propped on stool. Straight posture    Pelvic tilt proper breathing sequence 10x  x Exhale on posterior pelvic tilt inhale back to neutral pelvis   Marches 10x  x    Bent knee opening 10x Green band     Femur arcs 15*x  x           Book opening sidelying for thoracic articulation 5x             Wall partial squats back against wall 10x 5 count     NK table knee flexion/extension RLE/LLE 10#/5#* 10* x Backed off of weight on L, pt able to complete 10 reps today with no pain   // bars bilateral heelraises 10x  x    Rockerboard fwd.back, side/side 10x Neutral 30 seconds x    6 inch step ups eccentric lowering with light touch of right hand on rail.  LLE only 10x  x    Nustep  seat 9  LEs only 5 minutes        Specific Interventions Next Treatment: dry needling, myofascial decompression-dynamic cupping paraspinals lumbar region, soft tissue mobilization, decompression position as needed 90/90 position, lumbar articulation in flexion and painfree extension, thoracic articulation avoiding flexion due to osteopenia. Core strengthening, body mechanics, LLE strengthening ankle and hip. Activity/Treatment Tolerance:  [x]  Patient tolerated treatment well  []  Patient limited by fatigue  []  Patient limited by pain   []  Patient limited by medical complications  []  Other:     Assessment:Pt with improved pain today after receiving epidural Tuesday. Able to tolerate standing balance and strengthening program again today with no increase in pain. Reviewed home exercise program. Pt reports \"feeling good\" at end of session. Goals    Patient Goal:   Patient goals: to be painfree and be able to carry out normal activities with housework and gardening. Short Term Goals: 4 weeks  Patient to report of pain levels 3-4/10 to no greater than 6/10 with pain managed at 2/10 in 4 weeks at lumbosacral region and centralization of LLE radiculopathy with abolished symptoms to 4th and 5th toes. Patient to demonstrate increased flexibility and lumbar articulation with trunk flexion, extension and thoracic mobility in extension from moderate restrictions to min restrictions at these segments. Patient to demonstrate increased strength at core musculature with progression in program with increased awareness of optimal lumbar neutral, pelvic stability and strength at abdominals/back muscles. Patient to demonstrate increased strength LLE at ankle plantarflexors, hip flexors, hip abductors, hip extensors 4-/5 to 5/5. Long Term Goals: 8 weeks  Modified Oswestry from 16/50 to 10/50  2. Patient to demonstrate independence in a HEP with follow through of ex for core strength, LE strength, flexibility, body mechanics and decreased muscle tightness and pain management.     Patient Education:   [x]  HEP/Education Completed:Reviewed HEP. Completing exercises in pain free range.     []  No new Education completed  []  Reviewed Prior HEP      [x]  Patient verbalized and/or demonstrated understanding of education provided.  []  Patient unable to verbalize and/or demonstrate understanding of education provided.  Will continue education.  []  Barriers to learning:     PLAN:  Treatment Recommendations: Strengthening, Range of Motion, Functional Mobility Training, Transfer Training, Neuromuscular Re-education, Manual Therapy - Soft Tissue Mobilization, Pain Management, Home Exercise Program, Patient Education, Integrative Dry Needling, Aquatics, and Modalities    []  Plan of care initiated.  Plan to see patient 2 times per week for 8 weeks to address the treatment planned outlined above.  [x]  Continue with current plan of care  []  Modify plan of care as follows:    []  Hold pending physician visit  []  Discharge    Time In 0845   Time Out 0930   Timed Code Minutes: 45   Total Treatment Time: 45     Electronically Signed by: Raquel Bravo PTA

## 2023-02-01 ENCOUNTER — HOSPITAL ENCOUNTER (OUTPATIENT)
Dept: PHYSICAL THERAPY | Age: 78
Setting detail: THERAPIES SERIES
Discharge: HOME | End: 2023-02-01
Payer: MEDICARE

## 2023-02-01 PROCEDURE — 97110 THERAPEUTIC EXERCISES: CPT

## 2023-02-01 PROCEDURE — 97112 NEUROMUSCULAR REEDUCATION: CPT

## 2023-02-01 NOTE — PROGRESS NOTES
[] EVALUATION  [x] DAILY NOTE (LAND) [] DAILY NOTE (AQUATIC ) [] PROGRESS NOTE [] DISCHARGE NOTE    [x] OUTPATIENT REHABILITATION CENTER [facility]   [] [place] [address]    [] [facility]   [] Norah Ivy    Date: 2023  Patient Name:  Wang Myers  : 1945  MRN: [id number]  CSN: [id number]    Referring Practitioner TOO Fajardo*   Diagnosis Spinal stenosis of lumbar region without neurogenic claudication [M48.061]  Neuroforaminal stenosis of lumbosacral spine [M48.07]  Facet hypertrophy of lumbar region [M47.816]  Lumbar spondylosis [M47.816]  DDD (degenerative disc disease), lumbosacral [M51.37]  Lumbar radiculopathy [M54.16]  Chronic pain syndrome [G89.4]  Myofascial pain [M79.18]    Treatment Diagnosis Lumbar pain with radiculopathy LLE>RLE, with difficulty tolerating lying on back, functional mobility/activity   Date of Evaluation 22    Additional Pertinent History Osteopenia, history of previous PT appointments 11/10/2022 to 2022 for 4 visits for sciatica left side. Lateral stenosis and facet disorder. Was on hold and went to pain management physician. Functional Outcome Measure Used Modified Oswestry   Functional Outcome Score 16/50 (22)       Insurance: Primary: Payor: Alexus Story /  /  / ,   Secondary:    Authorization Information: PRE CERTIFICATION REQUIRED: NO  INSURANCE THERAPY BENEFIT:  NO VISIT LIMIT   AQUATIC THERAPY COVERED: YES  MODALITIES COVERED:  YES   TELEHEALTH COVERED:    REFERENCE NUMBER   Visit # 8,  2/10 for progress note   Visits Allowed: No visit limit   Recertification Date:    Physician Follow-Up: 2023   Physician Orders: Evaluation first available appointment, with dry needling   History of Present Illness: Patient reports of back pain that came on in October of this year. She has done a lot of heavy lifting and packing due to moving. She also was active with yardwork ie raking yard.  Notes pain pointing to lumbosacral region. She states that she did have sharp shooting pain down Left leg with rolling over in bed. Sharp pain in left leg goes to primarily 4th toe and some of her 5th toe. Burning feeling at toes. Notes pain also pointing to posterior lateral thigh. Some pain in left buttock but not too bad. The Right LE she just feels spasms or restless leg, pressure in her right calf. \"Veins are going to explode\" This sensation in her right calf , back and LLE symptoms are triggered with lying on her back. Notes most comfortable resting in recliner chair. Back pain is less with walking. Sitting does relieve symptoms somewhat. Frequent position changes also help. She takes hot showers to decrease her pain. Medication: Naproxen, Tramadol, Tylenol extra strength, muscle relaxant. Lidocaine patches not sure if helped. MRI and Xrays have been completed. Patient goals: to be painfree and be able to carry out normal activities with housework and gardening. SUBJECTIVE: Patient reports of less symptoms in left LE since injection. Notes intermittent tingling sensation. Calf region has been a lot less to not present. Strength is left LE at ankle and hip are improving.        TREATMENT   Precautions: Lumbar spondylosis, foraminal stenosis LLE calf symptoms   Pain: Denies any pain, reports \"soreness\" in low back     \"X in shaded column indicates activity completed today    *\" next to exercise/intervention indicates progression   Modalities Parameters/  Location  Notes                     Manual Therapy Time/Technique  Notes   Dry Needling - bilateral lumbar paraspinals (50 mm) 5 min  Tolerated well - assess response next visit and progress into gluteal region as tolerated   Myofascial decompression-dynamic cupping bilateral lumbar paraspinals 3 minutes gliding cups over paraspinals x Pronelying with pillow under abdomen and lower legs   Soft tissue mobilization manual therapy while pronelying left gluteal region and piriformis, glut medius  and light over bilateral paraspinals. Trigger point at glut medius and piriformis region/mid glut max  8 minutes    Trigger points held for 3-5 seconds and repeated 2x  Pronelying with pillow under abdomen and lower legs    Pt reporting this is much less tender today   Exercise/Intervention   Notes   Logroll technique for bed mobility       Dry needling explanation and informational material issued       Decompression position in 90/90 for pain management    10 minutes prior to use of ex and use of MHP as needed    Explanation of foraminal stenosis, spondylosis, DDD, radiculopathy       SKTC and DKTC 3x 30 sec x SKTC with patient in 90/90 starting position   Piriformis stretch 3x 30 sec x    Hamstring stretch 3x 30 sec x Cues to stay within range allowing full knee extension   LTR* 10x 10 sec  Hold on rotations          Pelvic stability with abdominal engagement neutral pelvis  5x   Inhale release abdominal, exhale tense abdominals/core   Ankle dorsiflexion and eversion with right ankle with resistance band x10 Orange   Seated in chair with heel propped on stool. Straight posture    Pelvic tilt proper breathing sequence 10x  x Exhale on posterior pelvic tilt inhale back to neutral pelvis   Marches 10x      Bent knee opening 10x Green band x    Femur arcs 15x  x    Quadruped UE/LE reciprocal, opposite arm/leg 5x each  x    Rest pose/child's pose following quadruped 3x  x    Book opening sidelying for thoracic articulation 5x             Wall partial squats back against wall 10x 5 count x    NK table knee flexion/extension RLE/LLE 10#/5#* 10*  Backed off of weight on L, pt able to complete 10 reps today with no pain   Single heel raises LLE 10x  x    Rockerboard fwd.back, side/side 10x Neutral 30 seconds     6 inch step ups eccentric lowering with light touch of right hand on rail.  LLE only 10x  x    Nustep  seat 9  LEs only 5 minutes        Specific Interventions Next Treatment: dry needling, myofascial decompression-dynamic cupping paraspinals lumbar region, soft tissue mobilization, decompression position as needed 90/90 position, lumbar articulation in flexion and painfree extension, thoracic articulation avoiding flexion due to osteopenia. Core strengthening, body mechanics, LLE strengthening ankle and hip. Activity/Treatment Tolerance:  [x]  Patient tolerated treatment well  []  Patient limited by fatigue  []  Patient limited by pain   []  Patient limited by medical complications  []  Other:     Assessment:Patient progressing well in PT. Added quadruped position for core work and maintaining optimal lumbar neutral positon with these ex and keeping pelvic stability. Note difficulty with stabilizing left latera lumbar and pelvis with reaching and extending RLE. Ex tolerated well. Noted pain level 0/10 and feeling more relaxed/muscles looser than before session. Goals    Patient Goal:   Patient goals: to be painfree and be able to carry out normal activities with housework and gardening. Short Term Goals: 4 weeks  Patient to report of pain levels 3-4/10 to no greater than 6/10 with pain managed at 2/10 in 4 weeks at lumbosacral region and centralization of LLE radiculopathy with abolished symptoms to 4th and 5th toes. Patient to demonstrate increased flexibility and lumbar articulation with trunk flexion, extension and thoracic mobility in extension from moderate restrictions to min restrictions at these segments. Patient to demonstrate increased strength at core musculature with progression in program with increased awareness of optimal lumbar neutral, pelvic stability and strength at abdominals/back muscles. Patient to demonstrate increased strength LLE at ankle plantarflexors, hip flexors, hip abductors, hip extensors 4-/5 to 5/5. Long Term Goals: 8 weeks  Modified Oswestry from 16/50 to 10/50  2.  Patient to demonstrate independence in a HEP with follow through of ex for core strength, LE strength, flexibility, body mechanics and decreased muscle tightness and pain management. Patient Education:   [x]  HEP/Education Completed:Reviewed HEP. Completing exercises in pain free range. []  No new Education completed  []  Reviewed Prior HEP      [x]  Patient verbalized and/or demonstrated understanding of education provided. []  Patient unable to verbalize and/or demonstrate understanding of education provided. Will continue education. []  Barriers to learning:     PLAN:  Treatment Recommendations: Strengthening, Range of Motion, Functional Mobility Training, Transfer Training, Neuromuscular Re-education, Manual Therapy - Soft Tissue Mobilization, Pain Management, Home Exercise Program, Patient Education, Integrative Dry Needling, Aquatics, and Modalities    []  Plan of care initiated. Plan to see patient 2 times per week for 8 weeks to address the treatment planned outlined above.   [x]  Continue with current plan of care  []  Modify plan of care as follows:    []  Hold pending physician visit  []  Discharge    Time In 0815   Time Out 0901   Timed Code Minutes: 46   Total Treatment Time: 46     Electronically Signed by: Jalen Escobar PT

## 2023-02-08 ENCOUNTER — PATIENT MESSAGE (OUTPATIENT)
Dept: FAMILY MEDICINE CLINIC | Age: 78
End: 2023-02-08

## 2023-02-08 DIAGNOSIS — M54.32 LEFT SIDED SCIATICA: ICD-10-CM

## 2023-02-08 DIAGNOSIS — M54.16 LUMBAR RADICULOPATHY: ICD-10-CM

## 2023-02-08 RX ORDER — CYCLOBENZAPRINE HCL 10 MG
10 TABLET ORAL 3 TIMES DAILY PRN
Qty: 30 TABLET | Refills: 0 | Status: SHIPPED | OUTPATIENT
Start: 2023-02-08 | End: 2023-02-18

## 2023-02-08 NOTE — TELEPHONE ENCOUNTER
Marc Nice called requesting a refill on the following medications:  Requested Prescriptions     Pending Prescriptions Disp Refills    cyclobenzaprine (FLEXERIL) 10 MG tablet 30 tablet 0     Sig: Take 1 tablet by mouth 3 times daily as needed for Muscle spasms       Date of last visit: 12/1/2022  Date of next visit (if applicable):Visit date not found  Date of last refill: 12/1/22  Pharmacy Name: [facility]    Rx pending  #30/0      Thanks,  Padmini Fuller LPN

## 2023-02-08 NOTE — TELEPHONE ENCOUNTER
From: Kimberly Cowan  To: Dr. Goyal Duck: 2/8/2023 8:50 AM EST  Subject: 4 mg flexeril RX    I am having leg spasms again at night that keeps me awake. Would you call in an RX of 4 mg Flexeril to [facility] on [facility] in [place]? Thanks.   Kimberly Cowan

## 2023-02-13 ENCOUNTER — OFFICE VISIT (OUTPATIENT)
Dept: PHYSICAL MEDICINE AND REHAB | Age: 78
End: 2023-02-13
Payer: MEDICARE

## 2023-02-13 VITALS
SYSTOLIC BLOOD PRESSURE: 112 MMHG | BODY MASS INDEX: 23.19 KG/M2 | DIASTOLIC BLOOD PRESSURE: 58 MMHG | HEIGHT: 68 IN | WEIGHT: 153 LBS

## 2023-02-13 DIAGNOSIS — M48.07 NEUROFORAMINAL STENOSIS OF LUMBOSACRAL SPINE: ICD-10-CM

## 2023-02-13 DIAGNOSIS — M79.18 MYOFASCIAL PAIN: ICD-10-CM

## 2023-02-13 DIAGNOSIS — M48.061 SPINAL STENOSIS OF LUMBAR REGION WITHOUT NEUROGENIC CLAUDICATION: Primary | ICD-10-CM

## 2023-02-13 DIAGNOSIS — M47.816 FACET HYPERTROPHY OF LUMBAR REGION: ICD-10-CM

## 2023-02-13 DIAGNOSIS — M51.37 DDD (DEGENERATIVE DISC DISEASE), LUMBOSACRAL: ICD-10-CM

## 2023-02-13 DIAGNOSIS — G89.4 CHRONIC PAIN SYNDROME: ICD-10-CM

## 2023-02-13 DIAGNOSIS — M47.816 LUMBAR SPONDYLOSIS: ICD-10-CM

## 2023-02-13 DIAGNOSIS — M54.16 LUMBAR RADICULOPATHY: ICD-10-CM

## 2023-02-13 PROCEDURE — 1123F ACP DISCUSS/DSCN MKR DOCD: CPT | Performed by: NURSE PRACTITIONER

## 2023-02-13 PROCEDURE — 99214 OFFICE O/P EST MOD 30 MIN: CPT | Performed by: NURSE PRACTITIONER

## 2023-02-13 NOTE — PROGRESS NOTES
Chronic Pain/PM&R Clinic Note     Encounter Date: 2/13/23    Subjective:   Chief Complaint:   Chief Complaint   Patient presents with    Follow-up     History of Present Illness:   Jaja Barkley is a 69 y.o. female seen in the clinic initially on 12/12/2022 upon request from SIDDHARTHA Reza for her history of low back pain. She has a personal medical history of osteopenia, situational stress, insomnia. She reports that she also discussed for years ago, has been doing more things when house, lifting and moving things, as well as she had recently moved    Patient presents with complaints of bilateral low back pain, does go into the left buttocks and down the left leg to her toes at times. She also reports she has bilateral calf pain that is worse at night. She states this pain has been ongoing for about 3 months or more. She states that she was working in the yard, lifting pavers, raking, and started have some pain. She states it has just been worsening over time. She describes her calf pain is restless, cramping, charley horses, worse at night. She describes the low back and left buttocks pain as a constant ache, and when she does get the left leg symptoms feels like a burning, shocking. She denies any numbness, loss of bowel or bladder control. She reports nothing in particular makes the pain better or worse. She states certain movements or exercises that she did in therapy would exacerbate it. She has tried ice, heat, massage with no relief. She was in physical therapy, stated some movements felt like it helped her pain, but the mother made the pain worse. She states this is old to see what our plan was. She reports she is not sleeping well, pain will wake her up throughout the night, keep her up at night. She states when the pain is flared up really bad, she feels a little weakness in her left leg, will use a cane or hold onto things if she needs to but not time.   She states she has not had any falls.  She does report she is sleeping in a recliner as she cannot lay flat or sleep very well at night. She states she has been getting steroid injections at the ED occasionally, most recent was 12/10/2022. She states it helps for about a week and then pain comes back. Pain scale : at worst pain is 10/10, at best pain is 2/10 after steroid injection at ED. Today, 2/13/2023, patient presents for planned procedural follow-up. She completed lumbar epidural steroid injection at L4-5 with Dr. Ken Fitzgerald 1/24/2023. She reports she received great relief from the injection. She states about 85-90% relief. She has some discomfort on the left foot and calf at night, some spasms or restless leg feeling. She refilled her flexeril from her primary provider with some relief. She reports going to physical therapy with good relief. She is doing home exercises at [facility] and looking into water therapy. Overall she is very pleased with the procedure and results. She reports she has not had to take any tramadol since her procedure. She denies any new concerns. History of Interventions:   Surgery: No previous lumbar surgeries  Injections: LESI at L4-5 (1/24/2030) - 90% relief    Current Treatment Medications:   Flexeril - helps some, helps her sleep    Tylenol - mild relief     Historical Treatment Medications:   Tramadol - mild relief   Lidocaine patches - no relief  Naproxen - mild relief  Voltaren- - no relief    Imaging:  Lumbar x-ray 11/5/2022  PROCEDURE: XR LUMBAR SPINE (2-3 VIEWS)       CLINICAL INFORMATION: 67-year-old female with left lower back pain for 3 weeks. COMPARISON: No prior study. TECHNIQUE: AP and lateral views of the lumbar spine. FINDINGS:       There are 5 nonrib-bearing lumbar vertebral bodies. The lumbar vertebral body heights and AP alignment are preserved. There is mild dextroconvex curvature of the lumbar spine. There is generalized osteopenia.  There is disc space narrowing at L1-L2. Facet arthropathy is present in the lower lumbar spine. Atherosclerotic calcifications through the abdominal aorta. There is narrowing of the sacroiliac joints. Impression   Degenerative changes with no acute fracture or subluxation. MRI lumbar spine 11/29/2022  PROCEDURE: MRI LUMBAR SPINE WO CONTRAST       CLINICAL INFORMATION: Pain of left lower extremity, Left sided sciatica. Low back pain radiating down both legs. No known injury. COMPARISON: Lumbar spine radiographs dated 11/5/2022. TECHNIQUE: Sagittal and axial T1 and T2-weighted images were obtained through the lumbar spine. FINDINGS:   There is a mild dextrocurvature of the lumbar spine, stable compared to prior radiographs. There is otherwise anatomic vertebral body height and alignment. There is disc space narrowing throughout the lumbar spine most pronounced at L1-2 and with    relative sparing at L5-S1, similar to prior radiographs. Marrow signal is within normal limits. The conus terminates in a normal fashion at the L1 level. No cauda equina nerve root thickening or clumping is identified. Paraspinal soft tissues are    unremarkable. At T12-L1 there is no significant spinal canal or neuroforaminal stenosis. At L1-2 there is a shallow disc bulge which mildly indents thecal sac and may contact traversing L2 nerve roots. There is mild to moderate right and moderate left neural foraminal stenosis in association with facet hypertrophy and ligamentum flavum    thickening. At L2-3 there is a shallow disc bulge which mildly indents thecal sac but does not contact traversing nerve roots. There is mild to moderate right and moderate left neural foraminal stenosis in association with facet hypertrophy and ligamentum flavum    thickening. At L3-4 there is a shallow disc bulge which mildly indents thecal sac and may contact traversing L4 nerve roots bilaterally.  There is mild to moderate bilateral foraminal stenosis in association with facet hypertrophy and ligamentum flavum thickening. At L4-5 there is a shallow disc bulge which mildly indents thecal sac but does not contact traversing nerve roots. There is mild bilateral neural foraminal stenosis in association with facet hypertrophy and ligamentum flavum thickening. At L5-S1 is no significant spinal canal stenosis. There is mild bilateral neural foraminal stenosis in association with facet hypertrophy and ligamentum flavum thickening. Impression    Multilevel degenerative changes with areas of mild spinal canal stenosis at the L1-2 and L4-5 levels and up to moderate neuroforaminal stenosis at L1-2 and L2-3.        Past Medical History:   Diagnosis Date    Anxiety        Past Surgical History:   Procedure Laterality Date    CARPAL TUNNEL RELEASE  1999    HYSTERECTOMY (CERVIX STATUS UNKNOWN)  1994    PAIN MANAGEMENT PROCEDURE N/A 1/24/2023    Lumbar 4-5 epidural steroid injection performed by Alissa Restrepo DO at [address] [place] [address] Rd       Family History   Problem Relation Age of Onset    Stroke Mother     Heart Attack Father     Breast Cancer Sister          Medications & Allergies:   Current Outpatient Medications   Medication Instructions    Acetaminophen (TYLENOL EXTRA STRENGTH PO) 2 tablets, Oral, EVERY 6 HOURS PRN    cyclobenzaprine (FLEXERIL) 10 mg, Oral, 3 TIMES DAILY PRN    diclofenac sodium (VOLTAREN) 2 g, Topical, 4 TIMES DAILY PRN    sertraline (ZOLOFT) 50 mg, Oral, DAILY    terbinafine (LAMISIL) 250 MG tablet     terbinafine (LAMISIL) 250 mg, Oral, DAILY       Allergies   Allergen Reactions    Demerol [Meperidine Hcl] Nausea And Vomiting       Review of Systems:   Constitutional: negative for weight changes or fevers  Genitourinary: negative for bowel/bladder incontinence   Musculoskeletal: positive for low back pain, left SI pain  Neurological: positive for left leg weakness, negative for numbness/tingling  Behavioral/Psych: negative for anxiety/depression   All other systems reviewed and are negative    Objective:     Vitals:    02/13/23 1124   BP: (!) 112/58       Constitutional: Pleasant, no acute distress   Head: Normocephalic, atraumatic   Eyes: Conjunctivae normal   Neck: Supple, symmetrical   Lungs: Normal respiratory effort, non-labored breathing   Cardiovascular: Limbs warm and well perfused   Abdomen: Non-protruded   Musculoskeletal: Muscle bulk symmetric, no atrophy, no gross deformities    · Lumbar Spine: ROM WNL. Lumbar paraspinals non-tender to palpation bilaterally. SLR positive bilaterally. JUAN neg bilaterally. GAENSLEN neg bilaterally. Negative facet loading bilaterally. Bilateral SI joints non-tender to palpation. SI Distraction maneuver negative on left/right side. Bilateral greater trochanters non-tender to palpation. Neurological: Cranial nerves II-XII grossly intact. · Gait - Normal, non-antalgic gait. Ambulates without assistive device. · Motor: 5/5 muscle strength in bilateral hip flexion, knee flexion, knee extension, ankle dorsiflexion, and ankle plantar flexion   · Sensory: LT sensation intact in lower limbs   Skin: No rashes or lesions present   Psychological: Cooperative, no exaggerated pain behaviors     Assessment:    Diagnosis Orders   1. Spinal stenosis of lumbar region without neurogenic claudication        2. Neuroforaminal stenosis of lumbosacral spine        3. Facet hypertrophy of lumbar region        4. Lumbar spondylosis        5. Lumbar radiculopathy        6. DDD (degenerative disc disease), lumbosacral        7. Myofascial pain        8. Chronic pain syndrome             Kurtis Lai is a 69 y. o.female presenting to the pain clinic for evaluation of low back pain with left leg symptoms, and bilateral calf pain.   Did discuss with her procedures to assist with pain control such as; lumbar epidural steroid injection at L4-5, transforaminal lumbar epidural steroid injections, lumbar facet series, SI joint injection series. With her complaints we will start with a lumbar epidural steroid injection at L4-5. Did discuss with her have to be cautious with steroids that she has had recent steroid injection from the emergency department, and that we need to not utilize those in order to get a procedure with us using steroid. At this time I have set her up with tramadol 50 mg twice daily. Discussed that she can continue naproxen, Tylenol, ice, heat, stretches as well assist with pain. I will see her back after procedures. With significant benefit from LESI @ L4-5 that continues today, we discussed completing therapy, continuing her home exercises and trialing a TENS unit. She can continue her flexeril from PCP, will take over if needed. Plan: The following treatment recommendations and plan were discussed in detail with Yanet Menjivar. Imaging:   I have reviewed patients imaging of lumbar MRI, lumbar x-ray and results were discussed with patient today. Analgesics: The patient is currently managing their pain with the use of Naproxen which is prescribed by PCP. We recommend that the patient follow the recommendation of the prescribing provider with regard to the above medication(s) and contact their prescribing provider for refills if needed. The side effect profile of long-term opioid use was discussed and recommended emphasis on multimodal strategies for pain management. Patient is taking Acetaminophen. Patient informed that the maximum amount of acetaminophen taken on a regular basis should only be 4000 mg per day. Patient is taking Naproxen. Patient is advised to take as prescribed and not take on an empty stomach. Adjuvants: For continued chronic pain with associated musculoskeletal component, the patient is advised to start Tramadol 50 mg BID PRN for severe pain(7-10/10).   Educated on side effects, addiction, tolerance, safe medication storage, office policy refill request    OARRS reviewed, pain contract agreement signed    Interventions:   None    Anticoagulation/NPO Recommendations:   Patient does need to hold any medications prior to the procedure - NSAIDs   Patient will need to be NPO x 8 hours prior to the procedure. We will start an IV prior to the procedure    Multidisciplinary Pain Management:   In the presence of complex, chronic, and multi-factorial pain, the importance of a multidisciplinary approach to pain management in the patients management regimen was emphasized and discussed in great detail. PHYSICAL THERAPY: Patient is advised to see a physical therapist for gentle stretching exercises and conditioning exercises for management of pain. The patient was also advised to continue physical therapy and stretching exercises at home and cognitive behavioral and/or group therapy. Referrals:      Prescriptions Written This Visit:   None    Follow-up:   8 weeks    It was my pleasure to evaluate Melvin Rogers today. I spent over 35 minutes evaluating this patient, reviewing previous notes and images and completing documentation.        SIDDHARTHA Solis - CNP   Interventional Pain Management/PM&R   [place]

## 2023-02-15 ENCOUNTER — HOSPITAL ENCOUNTER (OUTPATIENT)
Dept: PHYSICAL THERAPY | Age: 78
Setting detail: THERAPIES SERIES
Discharge: HOME | End: 2023-02-15
Payer: MEDICARE

## 2023-02-15 PROCEDURE — 97112 NEUROMUSCULAR REEDUCATION: CPT

## 2023-02-15 PROCEDURE — 97110 THERAPEUTIC EXERCISES: CPT

## 2023-02-15 NOTE — PROGRESS NOTES
[] EVALUATION  [x] DAILY NOTE (LAND) [] DAILY NOTE (AQUATIC ) [] PROGRESS NOTE [] DISCHARGE NOTE    [x] OUTPATIENT REHABILITATION CENTER [facility]   [] [place] [id number]    [] [facility]   [] Dylan Pitchdana    Date: 2/15/2023  Patient Name:  Fam Ortiz  : 1945  MRN: [id number]  CSN: [id number]    Referring Practitioner TOO Clark*   Diagnosis Spinal stenosis of lumbar region without neurogenic claudication [M48.061]  Neuroforaminal stenosis of lumbosacral spine [M48.07]  Facet hypertrophy of lumbar region [M47.816]  Lumbar spondylosis [M47.816]  DDD (degenerative disc disease), lumbosacral [M51.37]  Lumbar radiculopathy [M54.16]  Chronic pain syndrome [G89.4]  Myofascial pain [M79.18]    Treatment Diagnosis Lumbar pain with radiculopathy LLE>RLE, with difficulty tolerating lying on back, functional mobility/activity   Date of Evaluation 22    Additional Pertinent History Osteopenia, history of previous PT appointments 11/10/2022 to 2022 for 4 visits for sciatica left side. Lateral stenosis and facet disorder. Was on hold and went to pain management physician. Functional Outcome Measure Used Modified Oswestry   Functional Outcome Score 16/50 (22)       Insurance: Primary: Payor: Louise Gibson /  /  / ,   Secondary:    Authorization Information: PRE CERTIFICATION REQUIRED: NO  INSURANCE THERAPY BENEFIT:  NO VISIT LIMIT   AQUATIC THERAPY COVERED: YES  MODALITIES COVERED:  YES   TELEHEALTH COVERED:    REFERENCE NUMBER   Visit # 9,  3/10 for progress note   Visits Allowed: No visit limit   Recertification Date:    Physician Follow-Up: 2023   Physician Orders: Evaluation first available appointment, with dry needling   History of Present Illness: Patient reports of back pain that came on in October of this year. She has done a lot of heavy lifting and packing due to moving. She also was active with yardwork ie raking yard.  Notes pain pointing to lumbosacral region. She states that she did have sharp shooting pain down Left leg with rolling over in bed. Sharp pain in left leg goes to primarily 4th toe and some of her 5th toe. Burning feeling at toes. Notes pain also pointing to posterior lateral thigh. Some pain in left buttock but not too bad. The Right LE she just feels spasms or restless leg, pressure in her right calf. \"Veins are going to explode\" This sensation in her right calf , back and LLE symptoms are triggered with lying on her back. Notes most comfortable resting in recliner chair. Back pain is less with walking. Sitting does relieve symptoms somewhat. Frequent position changes also help. She takes hot showers to decrease her pain. Medication: Naproxen, Tramadol, Tylenol extra strength, muscle relaxant. Lidocaine patches not sure if helped. MRI and Xrays have been completed. Patient goals: to be painfree and be able to carry out normal activities with housework and gardening. SUBJECTIVE: Patient reports that last night is the first night that she had less LLE sympotms of 1/10. Notes [place] [address] [place] area and able to use therap pool and fitness gym. She enjoyed the pool ex and also has been using recumbant bike and Nustep.  Weigths on cables bothered her    TREATMENT   Precautions: Lumbar spondylosis, foraminal stenosis LLE calf symptoms   Pain: Denies any pain, reports \"soreness\" in low back     \"X in shaded column indicates activity completed today    *\" next to exercise/intervention indicates progression   Modalities Parameters/  Location  Notes                     Manual Therapy Time/Technique  Notes   Dry Needling - bilateral lumbar paraspinals (50 mm) 5 min  Tolerated well - assess response next visit and progress into gluteal region as tolerated   Myofascial decompression-dynamic cupping bilateral lumbar paraspinals 3 minutes gliding cups over paraspinals  Pronelying with pillow under abdomen and lower legs   Soft tissue mobilization manual therapy while pronelying left gluteal region and piriformis, glut medius  and light over bilateral paraspinals. Trigger point at glut medius and piriformis region/mid glut max  8 minutes    Trigger points held for 3-5 seconds and repeated 2x  Pronelying with pillow under abdomen and lower legs    Pt reporting this is much less tender today   Exercise/Intervention   Notes   Logroll technique for bed mobility       Dry needling explanation and informational material issued       Decompression position in 90/90 for pain management    10 minutes prior to use of ex and use of MHP as needed    Explanation of foraminal stenosis, spondylosis, DDD, radiculopathy       SKTC and DKTC 3x 30 sec  SKTC with patient in 90/90 starting position   Piriformis stretch 3x 30 sec     Hamstring stretch 3x 30 sec  Cues to stay within range allowing full knee extension   LTR* 10x 10 sec  Hold on rotations          Pelvic stability with abdominal engagement neutral pelvis  5x   Inhale release abdominal, exhale tense abdominals/core   Ankle dorsiflexion and eversion with right ankle with resistance band x10 Green  x Seated in chair with heel propped on stool.  Straight posture    Pelvic tilt proper breathing sequence 15x   Exhale on posterior pelvic tilt inhale back to neutral pelvis   Marches 10x      Bent knee opening 10x Green band     Femur arcs 15x      Quadruped UE/LE reciprocal, opposite arm/leg 5x each  x Towel roll under wrist/to avoid stress on wrist   Rest pose/child's pose following quadruped 3x      Book opening sidelying for thoracic articulation 5x  x           Wall partial squats back against wall 10x 5 count x    NK table knee flexion/extension RLE/LLE 10#RLE/7.5 LLE 10x x Backed off of weight on L, pt able to complete 10 reps today with no pain   Single heel raises LLE and Bilateral heelraises 10x-15x  x    Rockerboard fwd.back, side/side 10x Neutral 30 seconds x    6 inch step ups eccentric lowering with light touch of right hand on rail. LLE /RLE 10x  x    Nustep  seat 9  LEs only 5 minutes        Specific Interventions Next Treatment: dry needling, myofascial decompression-dynamic cupping paraspinals lumbar region, soft tissue mobilization, decompression position as needed 90/90 position, lumbar articulation in flexion and painfree extension, thoracic articulation avoiding flexion due to osteopenia. Core strengthening, body mechanics, LLE strengthening ankle and hip. Activity/Treatment Tolerance:  [x]  Patient tolerated treatment well  []  Patient limited by fatigue  []  Patient limited by pain   []  Patient limited by medical complications  []  Other:     Assessment:Provided multiple cues for alignment and core/abdominal engagement with exercises today. Noted difficulty with quadruped ex. And balance on rockerboard today. Patient did have some vertigo in session and recommended a vestibular evaluation. Patient to contact family physician. Progressed through strengthening ex for LE and core work well with increased reps with ex. Goals    Patient Goal:   Patient goals: to be painfree and be able to carry out normal activities with housework and gardening. Short Term Goals: 4 weeks  Patient to report of pain levels 3-4/10 to no greater than 6/10 with pain managed at 2/10 in 4 weeks at lumbosacral region and centralization of LLE radiculopathy with abolished symptoms to 4th and 5th toes. Patient to demonstrate increased flexibility and lumbar articulation with trunk flexion, extension and thoracic mobility in extension from moderate restrictions to min restrictions at these segments. Patient to demonstrate increased strength at core musculature with progression in program with increased awareness of optimal lumbar neutral, pelvic stability and strength at abdominals/back muscles.   Patient to demonstrate increased strength LLE at ankle plantarflexors, hip flexors, hip abductors, hip extensors 4-/5 to 5/5. Long Term Goals: 8 weeks  Modified Oswestry from 16/50 to 10/50  2. Patient to demonstrate independence in a HEP with follow through of ex for core strength, LE strength, flexibility, body mechanics and decreased muscle tightness and pain management. Patient Education:   [x]  HEP/Education Completed:Reviewed HEP. Completing exercises in pain free range. []  No new Education completed  []  Reviewed Prior HEP      [x]  Patient verbalized and/or demonstrated understanding of education provided. []  Patient unable to verbalize and/or demonstrate understanding of education provided. Will continue education. []  Barriers to learning:     PLAN:  Treatment Recommendations: Strengthening, Range of Motion, Functional Mobility Training, Transfer Training, Neuromuscular Re-education, Manual Therapy - Soft Tissue Mobilization, Pain Management, Home Exercise Program, Patient Education, Integrative Dry Needling, Aquatics, and Modalities    []  Plan of care initiated. Plan to see patient 2 times per week for 8 weeks to address the treatment planned outlined above.   [x]  Continue with current plan of care  []  Modify plan of care as follows:    []  Hold pending physician visit  []  Discharge    Time In 0915   Time Out 0957   Timed Code Minutes: 42   Total Treatment Time: 42     Electronically Signed by: Luisito Patel, PT

## 2023-02-22 ENCOUNTER — HOSPITAL ENCOUNTER (OUTPATIENT)
Dept: PHYSICAL THERAPY | Age: 78
Setting detail: THERAPIES SERIES
Discharge: HOME | End: 2023-02-22
Payer: MEDICARE

## 2023-02-22 PROCEDURE — 97164 PT RE-EVAL EST PLAN CARE: CPT

## 2023-02-22 NOTE — DISCHARGE SUMMARY
[] EVALUATION  [] DAILY NOTE (LAND) [] DAILY NOTE (AQUATIC ) [] PROGRESS NOTE [x] DISCHARGE NOTE    [x] OUTPATIENT REHABILITATION CENTER [facility]   [] [place] [id number]    [] [facility]   [] Skyler Rogerela    Date: 2023  Patient Name:  Jihan Sullivan  : 1945  MRN: [id number]  CSN: [id number]    Referring Practitioner TOO Dunlap*   Diagnosis Spinal stenosis of lumbar region without neurogenic claudication [M48.061]  Neuroforaminal stenosis of lumbosacral spine [M48.07]  Facet hypertrophy of lumbar region [M47.816]  Lumbar spondylosis [M47.816]  DDD (degenerative disc disease), lumbosacral [M51.37]  Lumbar radiculopathy [M54.16]  Chronic pain syndrome [G89.4]  Myofascial pain [M79.18]    Treatment Diagnosis Lumbar pain with radiculopathy LLE>RLE, with difficulty tolerating lying on back, functional mobility/activity   Date of Evaluation 22    Additional Pertinent History Osteopenia, history of previous PT appointments 11/10/2022 to 2022 for 4 visits for sciatica left side. Lateral stenosis and facet disorder. Was on hold and went to pain management physician. Functional Outcome Measure Used Modified Oswestry   Functional Outcome Score 16/50 (22)       Insurance: Primary: Payor: [facility] Toston Maida /  /  / ,   Secondary:    Authorization Information: PRE CERTIFICATION REQUIRED: NO  INSURANCE THERAPY BENEFIT:  NO VISIT LIMIT   AQUATIC THERAPY COVERED: YES  MODALITIES COVERED:  YES   TELEHEALTH COVERED:    REFERENCE NUMBER   Visit # 10,  4 DISCHARGE NOTE   Visits Allowed: No visit limit   Recertification Date:    Physician Follow-Up: 2023   Physician Orders: Evaluation first available appointment, with dry needling   History of Present Illness: Patient reports of back pain that came on in October of this year. She has done a lot of heavy lifting and packing due to moving. She also was active with yardwork ie raking yard.  Notes pain pointing to lumbosacral region. She states that she did have sharp shooting pain down Left leg with rolling over in bed. Sharp pain in left leg goes to primarily 4th toe and some of her 5th toe. Burning feeling at toes. Notes pain also pointing to posterior lateral thigh. Some pain in left buttock but not too bad. The Right LE she just feels spasms or restless leg, pressure in her right calf. \"Veins are going to explode\" This sensation in her right calf , back and LLE symptoms are triggered with lying on her back. Notes most comfortable resting in recliner chair. Back pain is less with walking. Sitting does relieve symptoms somewhat. Frequent position changes also help. She takes hot showers to decrease her pain. Medication: Naproxen, Tramadol, Tylenol extra strength, muscle relaxant. Lidocaine patches not sure if helped. MRI and Xrays have been completed. Patient goals: to be painfree and be able to carry out normal activities with housework and gardening. SUBJECTIVE: Patient reports of feeling very satisfied with PT treatments. Patient progressed in PT with less back pain and no calf symptoms. She has been sleeping at night time now. Notes pain level 0/10 to only 0.25 of pain. Patient noting no soreness in back with just a small spot. Notes some discomfort with crossing right leg over left. Strength is  a lot better in her LLE. Notes heelraises and stair steps much better without weakness noted. Patient understands her HEP. Patient plans to work on aquatic therapy at [facility] and use of exercise room following exercise principles.    TREATMENT   Precautions: Lumbar spondylosis, foraminal stenosis LLE calf symptoms   Pain: Denies any pain, reports \"soreness\" in low back     \"X in shaded column indicates activity completed today    *\" next to exercise/intervention indicates progression   Modalities Parameters/  Location  Notes                     Manual Therapy Time/Technique  Notes   Dry Needling - bilateral lumbar paraspinals (50 mm) 5 min  Tolerated well - assess response next visit and progress into gluteal region as tolerated   Myofascial decompression-dynamic cupping bilateral lumbar paraspinals 3 minutes gliding cups over paraspinals  Pronelying with pillow under abdomen and lower legs   Soft tissue mobilization manual therapy while pronelying left gluteal region and piriformis, glut medius  and light over bilateral paraspinals. Trigger point at glut medius and piriformis region/mid glut max  8 minutes    Trigger points held for 3-5 seconds and repeated 2x  Pronelying with pillow under abdomen and lower legs    Pt reporting this is much less tender today   Exercise/Intervention   Notes   Logroll technique for bed mobility       Dry needling explanation and informational material issued       Decompression position in 90/90 for pain management    10 minutes prior to use of ex and use of MHP as needed    Reassessment 28 minutes  x    Explanation of foraminal stenosis, spondylosis, DDD, radiculopathy       SKTC and DKTC 3x 30 sec  SKTC with patient in 90/90 starting position   Piriformis stretch 3x 30 sec     Hamstring stretch 3x 30 sec  Cues to stay within range allowing full knee extension   LTR* 10x 10 sec  Hold on rotations          Pelvic stability with abdominal engagement neutral pelvis  5x   Inhale release abdominal, exhale tense abdominals/core   Ankle dorsiflexion and eversion with right ankle with resistance band x10 Green   Seated in chair with heel propped on stool.  Straight posture    Pelvic tilt proper breathing sequence 15x   Exhale on posterior pelvic tilt inhale back to neutral pelvis   Marches 10x      Bent knee opening 10x Green band     Femur arcs 15x      Quadruped UE/LE reciprocal, opposite arm/leg 5x each   Towel roll under wrist/to avoid stress on wrist   Rest pose/child's pose following quadruped 3x      Book opening sidelying for thoracic articulation 5x             Wall partial squats back against wall 10x 5 count     NK table knee flexion/extension RLE/LLE 10#RLE/7.5 LLE 10x  Backed off of weight on L, pt able to complete 10 reps today with no pain   Single heel raises LLE and Bilateral heelraises 10x-15x      Rockerboard fwd.back, side/side 10x Neutral 30 seconds     6 inch step ups eccentric lowering with light touch of right hand on rail. LLE /RLE 10x      Nustep  seat 9  LEs only 5 minutes        Specific Interventions Next Treatment: dry needling, myofascial decompression-dynamic cupping paraspinals lumbar region, soft tissue mobilization, decompression position as needed 90/90 position, lumbar articulation in flexion and painfree extension, thoracic articulation avoiding flexion due to osteopenia. Core strengthening, body mechanics, LLE strengthening ankle and hip. Activity/Treatment Tolerance:  [x]  Patient tolerated treatment well  []  Patient limited by fatigue  []  Patient limited by pain   []  Patient limited by medical complications  []  Other:     Assessment:Reassessment today. Patient to be discharged today from PT. All goals were met. ROM WNLS, Strength WNLS LLE with slight decreased plantarflexors 4+/5, MOdified Oswestry 6/50 improved significantly. Great progression in core strength, LLE strength, pain management of back and no longer radiculopathy LLE. Independent with HEP. Goals    Patient Goal:   Patient goals: to be painfree and be able to carry out normal activities with housework and gardening. GOAL BEING MET Has not had to take any pain pills and able to sew, work in kitchen and light housework. She was able to pull weeds in garden 15-20 min yardwork. Short Term Goals: 4 weeks  Patient to report of pain levels 3-4/10 to no greater than 6/10 with pain managed at 2/10 in 4 weeks at lumbosacral region and centralization of LLE radiculopathy with abolished symptoms to 4th and 5th toes.   GOAL MET symptoms are now centralized without toe or left calf symptoms. BAck pain essentially 0/10. Patient to demonstrate increased flexibility and lumbar articulation with trunk flexion, extension and thoracic mobility in extension from moderate restrictions to min restrictions at these segments. GOAL MET Patient demonstrates normal flexibility with fingertips to floor and improving curve reversal at lumbar spine. Patient to demonstrate increased strength at core musculature with progression in program with increased awareness of optimal lumbar neutral, pelvic stability and strength at abdominals/back muscles. GOAL MET patient has progressed through core work in supine, standing and seated positions with good/great tolerance. Patient has HEP to follow. Patient to demonstrate increased strength LLE at ankle plantarflexors, hip flexors, hip abductors, hip extensors 4-/5 to 5/5. GOAL MET strength with MMT WNLS. 5/5 now LLE. Slight decreased plantarflexors 4+/5  Long Term Goals: 8 weeks  Modified Oswestry from 16/50 to 10/50  GOAL MET Oswestry 6/50  2. Patient to demonstrate independence in a HEP with follow through of ex for core strength, LE strength, flexibility, body mechanics and decreased muscle tightness and pain management. GOAL MET Patient independent in HEP with follow through with exercises. Patient Education:   [x]  HEP/Education Completed:Reviewed HEP. []  No new Education completed  []  Reviewed Prior HEP      [x]  Patient verbalized and/or demonstrated understanding of education provided. []  Patient unable to verbalize and/or demonstrate understanding of education provided. Will continue education.   []  Barriers to learning:     PLAN:  Treatment Recommendations: Strengthening, Range of Motion, Functional Mobility Training, Transfer Training, Neuromuscular Re-education, Manual Therapy - Soft Tissue Mobilization, Pain Management, Home Exercise Program, Patient Education, Integrative Dry Needling, Aquatics, and Modalities    []  Plan of care initiated. Plan to see patient 2 times per week for 8 weeks to address the treatment planned outlined above.   []  Continue with current plan of care  []  Modify plan of care as follows:    []  Hold pending physician visit  [x]  Discharge    Time In 0915   Time Out 0947   Timed Code Minutes: 5   Total Treatment Time: 32     Electronically Signed by: Stephany Dc PT

## 2023-05-31 SDOH — ECONOMIC STABILITY: TRANSPORTATION INSECURITY
IN THE PAST 12 MONTHS, HAS LACK OF TRANSPORTATION KEPT YOU FROM MEETINGS, WORK, OR FROM GETTING THINGS NEEDED FOR DAILY LIVING?: NO

## 2023-05-31 SDOH — ECONOMIC STABILITY: HOUSING INSECURITY
IN THE LAST 12 MONTHS, WAS THERE A TIME WHEN YOU DID NOT HAVE A STEADY PLACE TO SLEEP OR SLEPT IN A SHELTER (INCLUDING NOW)?: NO

## 2023-05-31 SDOH — ECONOMIC STABILITY: FOOD INSECURITY: WITHIN THE PAST 12 MONTHS, THE FOOD YOU BOUGHT JUST DIDN'T LAST AND YOU DIDN'T HAVE MONEY TO GET MORE.: NEVER TRUE

## 2023-05-31 SDOH — ECONOMIC STABILITY: INCOME INSECURITY: HOW HARD IS IT FOR YOU TO PAY FOR THE VERY BASICS LIKE FOOD, HOUSING, MEDICAL CARE, AND HEATING?: NOT HARD AT ALL

## 2023-05-31 SDOH — ECONOMIC STABILITY: FOOD INSECURITY: WITHIN THE PAST 12 MONTHS, YOU WORRIED THAT YOUR FOOD WOULD RUN OUT BEFORE YOU GOT MONEY TO BUY MORE.: NEVER TRUE

## 2023-05-31 ASSESSMENT — PATIENT HEALTH QUESTIONNAIRE - PHQ9
SUM OF ALL RESPONSES TO PHQ QUESTIONS 1-9: 0
SUM OF ALL RESPONSES TO PHQ QUESTIONS 1-9: 0
1. LITTLE INTEREST OR PLEASURE IN DOING THINGS: 0
SUM OF ALL RESPONSES TO PHQ9 QUESTIONS 1 & 2: 0
1. LITTLE INTEREST OR PLEASURE IN DOING THINGS: NOT AT ALL
SUM OF ALL RESPONSES TO PHQ9 QUESTIONS 1 & 2: 0
SUM OF ALL RESPONSES TO PHQ QUESTIONS 1-9: 0
SUM OF ALL RESPONSES TO PHQ QUESTIONS 1-9: 0
2. FEELING DOWN, DEPRESSED OR HOPELESS: 0
2. FEELING DOWN, DEPRESSED OR HOPELESS: NOT AT ALL

## 2023-06-02 ENCOUNTER — OFFICE VISIT (OUTPATIENT)
Dept: FAMILY MEDICINE CLINIC | Age: 78
End: 2023-06-02
Payer: MEDICARE

## 2023-06-02 VITALS
OXYGEN SATURATION: 97 % | DIASTOLIC BLOOD PRESSURE: 64 MMHG | WEIGHT: 159 LBS | HEIGHT: 68 IN | SYSTOLIC BLOOD PRESSURE: 118 MMHG | HEART RATE: 59 BPM | BODY MASS INDEX: 24.1 KG/M2 | TEMPERATURE: 97.6 F

## 2023-06-02 DIAGNOSIS — M47.26 OSTEOARTHRITIS OF SPINE WITH RADICULOPATHY, LUMBAR REGION: ICD-10-CM

## 2023-06-02 DIAGNOSIS — M85.852 OSTEOPENIA OF LEFT HIP: ICD-10-CM

## 2023-06-02 DIAGNOSIS — G47.00 INSOMNIA, UNSPECIFIED TYPE: Primary | ICD-10-CM

## 2023-06-02 DIAGNOSIS — E78.00 HYPERCHOLESTEREMIA: ICD-10-CM

## 2023-06-02 DIAGNOSIS — F43.9 SITUATIONAL STRESS: ICD-10-CM

## 2023-06-02 DIAGNOSIS — Z12.31 ENCOUNTER FOR SCREENING MAMMOGRAM FOR MALIGNANT NEOPLASM OF BREAST: ICD-10-CM

## 2023-06-02 PROCEDURE — 1123F ACP DISCUSS/DSCN MKR DOCD: CPT | Performed by: FAMILY MEDICINE

## 2023-06-02 PROCEDURE — 99214 OFFICE O/P EST MOD 30 MIN: CPT | Performed by: FAMILY MEDICINE

## 2023-06-02 RX ORDER — DOXEPIN HYDROCHLORIDE 10 MG/1
10 CAPSULE ORAL NIGHTLY
Qty: 30 CAPSULE | Refills: 0 | Status: SHIPPED | OUTPATIENT
Start: 2023-06-02 | End: 2023-07-02

## 2023-06-02 ASSESSMENT — ENCOUNTER SYMPTOMS: SHORTNESS OF BREATH: 0

## 2023-06-02 NOTE — PROGRESS NOTES
with heart disease or stroke. She chooses not to be on a statin. She does follow a fairly healthy diet and is quite active. HDL is really good    Review of Systems   Constitutional:  Negative for fatigue and fever. Respiratory:  Negative for shortness of breath. Cardiovascular:  Negative for chest pain and leg swelling. Psychiatric/Behavioral:  Positive for sleep disturbance. Objective   Physical Exam  Constitutional:       General: She is not in acute distress. Appearance: Normal appearance. She is not ill-appearing. Cardiovascular:      Rate and Rhythm: Normal rate and regular rhythm. Heart sounds: No murmur heard. Pulmonary:      Effort: Pulmonary effort is normal. No respiratory distress. Breath sounds: Normal breath sounds. No wheezing. Musculoskeletal:         General: No swelling. Neurological:      Mental Status: She is alert and oriented to person, place, and time. Psychiatric:         Mood and Affect: Mood normal.              This office note may have been at least partially dictated. Effort was made to review for errors but some may have been missed. Please contact Phillip Maldonado of note for clarification if needed. An electronic signature was used to authenticate this note.     --Keyon Keller MD

## 2023-06-08 ENCOUNTER — HOSPITAL ENCOUNTER (OUTPATIENT)
Dept: WOMENS IMAGING | Age: 78
Discharge: HOME | End: 2023-06-08
Attending: FAMILY MEDICINE
Payer: MEDICARE

## 2023-06-08 DIAGNOSIS — Z12.31 ENCOUNTER FOR SCREENING MAMMOGRAM FOR MALIGNANT NEOPLASM OF BREAST: ICD-10-CM

## 2023-06-08 PROCEDURE — 77063 BREAST TOMOSYNTHESIS BI: CPT

## 2023-07-05 ENCOUNTER — HOSPITAL ENCOUNTER (OUTPATIENT)
Age: 78
Discharge: HOME | End: 2023-07-05
Payer: MEDICARE

## 2023-07-05 DIAGNOSIS — E78.00 HYPERCHOLESTEREMIA: ICD-10-CM

## 2023-07-05 LAB
ALBUMIN SERPL BCG-MCNC: 4.1 G/DL (ref 3.5–5.1)
ALP SERPL-CCNC: 106 U/L (ref 38–126)
ALT SERPL W/O P-5'-P-CCNC: 15 U/L (ref 11–66)
ANION GAP SERPL CALC-SCNC: 15 MEQ/L (ref 8–16)
AST SERPL-CCNC: 24 U/L (ref 5–40)
BASOPHILS ABSOLUTE: 0 THOU/MM3 (ref 0–0.1)
BASOPHILS NFR BLD AUTO: 0.9 %
BILIRUB SERPL-MCNC: 0.4 MG/DL (ref 0.3–1.2)
BUN SERPL-MCNC: 8 MG/DL (ref 7–22)
CALCIUM SERPL-MCNC: 9.1 MG/DL (ref 8.5–10.5)
CHLORIDE SERPL-SCNC: 104 MEQ/L (ref 98–111)
CHOLEST SERPL-MCNC: 281 MG/DL (ref 100–199)
CO2 SERPL-SCNC: 22 MEQ/L (ref 23–33)
CREAT SERPL-MCNC: 0.7 MG/DL (ref 0.4–1.2)
DEPRECATED RDW RBC AUTO: 41.2 FL (ref 35–45)
EOSINOPHIL NFR BLD AUTO: 2.8 %
EOSINOPHILS ABSOLUTE: 0.1 THOU/MM3 (ref 0–0.4)
ERYTHROCYTE [DISTWIDTH] IN BLOOD BY AUTOMATED COUNT: 11.8 % (ref 11.5–14.5)
GFR SERPL CREATININE-BSD FRML MDRD: > 60 ML/MIN/1.73M2
GLUCOSE SERPL-MCNC: 109 MG/DL (ref 70–108)
HCT VFR BLD AUTO: 42 % (ref 37–47)
HDLC SERPL-MCNC: 56 MG/DL
HGB BLD-MCNC: 13.7 GM/DL (ref 12–16)
IMM GRANULOCYTES # BLD AUTO: 0.01 THOU/MM3 (ref 0–0.07)
IMM GRANULOCYTES NFR BLD AUTO: 0.2 %
LDLC SERPL CALC-MCNC: 201 MG/DL
LYMPHOCYTES ABSOLUTE: 1.5 THOU/MM3 (ref 1–4.8)
LYMPHOCYTES NFR BLD AUTO: 32.4 %
MCH RBC QN AUTO: 31.3 PG (ref 26–33)
MCHC RBC AUTO-ENTMCNC: 32.6 GM/DL (ref 32.2–35.5)
MCV RBC AUTO: 95.9 FL (ref 81–99)
MONOCYTES ABSOLUTE: 0.4 THOU/MM3 (ref 0.4–1.3)
MONOCYTES NFR BLD AUTO: 8.5 %
NEUTROPHILS NFR BLD AUTO: 55.2 %
NRBC BLD AUTO-RTO: 0 /100 WBC
PLATELET # BLD AUTO: 305 THOU/MM3 (ref 130–400)
PMV BLD AUTO: 10.5 FL (ref 9.4–12.4)
POTASSIUM SERPL-SCNC: 4.3 MEQ/L (ref 3.5–5.2)
PROT SERPL-MCNC: 6.8 G/DL (ref 6.1–8)
RBC # BLD AUTO: 4.38 MILL/MM3 (ref 4.2–5.4)
SEGMENTED NEUTROPHILS ABSOLUTE COUNT: 2.5 THOU/MM3 (ref 1.8–7.7)
SODIUM SERPL-SCNC: 141 MEQ/L (ref 135–145)
TRIGL SERPL-MCNC: 120 MG/DL (ref 0–199)
WBC # BLD AUTO: 4.6 THOU/MM3 (ref 4.8–10.8)

## 2023-07-05 PROCEDURE — 85025 COMPLETE CBC W/AUTO DIFF WBC: CPT

## 2023-07-05 PROCEDURE — 80053 COMPREHEN METABOLIC PANEL: CPT

## 2023-07-05 PROCEDURE — 80061 LIPID PANEL: CPT

## 2023-07-05 PROCEDURE — 36415 COLL VENOUS BLD VENIPUNCTURE: CPT

## 2023-08-03 ENCOUNTER — HOSPITAL ENCOUNTER (EMERGENCY)
Age: 78
Discharge: HOME | End: 2023-08-03
Payer: MEDICARE

## 2023-08-03 VITALS
OXYGEN SATURATION: 98 % | HEART RATE: 71 BPM | DIASTOLIC BLOOD PRESSURE: 51 MMHG | SYSTOLIC BLOOD PRESSURE: 105 MMHG | RESPIRATION RATE: 16 BRPM | TEMPERATURE: 97.2 F

## 2023-08-03 DIAGNOSIS — H00.012 HORDEOLUM EXTERNUM OF RIGHT LOWER EYELID: Primary | ICD-10-CM

## 2023-08-03 DIAGNOSIS — L03.211 FACIAL CELLULITIS: ICD-10-CM

## 2023-08-03 PROCEDURE — 99213 OFFICE O/P EST LOW 20 MIN: CPT

## 2023-08-03 RX ORDER — ERYTHROMYCIN 5 MG/G
OINTMENT OPHTHALMIC
Qty: 3.5 G | Refills: 0 | Status: SHIPPED | OUTPATIENT
Start: 2023-08-03

## 2023-08-03 RX ORDER — SULFAMETHOXAZOLE AND TRIMETHOPRIM 800; 160 MG/1; MG/1
1 TABLET ORAL 2 TIMES DAILY
Qty: 20 TABLET | Refills: 0 | Status: SHIPPED | OUTPATIENT
Start: 2023-08-03 | End: 2023-08-13

## 2023-08-03 ASSESSMENT — ENCOUNTER SYMPTOMS
VOMITING: 0
COUGH: 0
DIARRHEA: 0
NAUSEA: 0
ABDOMINAL PAIN: 0
EYE REDNESS: 0
CHEST TIGHTNESS: 0
EYE ITCHING: 0
SHORTNESS OF BREATH: 0

## 2023-08-03 NOTE — ED PROVIDER NOTES
BRENNA Pereira, APRN - CNP    Please note that some or all of this chart was generated using [employer] voice recognition software. Although every effort was made to ensure the accuracy of this automated transcription, some errors in transcription may have occurred.          SIDDHARTHA Montenegro - KERRY  08/03/23 1038

## 2023-08-03 NOTE — ED NOTES
To [facility] with complaints of stye right eye. Started Sunday. States that now she has pain under and around her eye.  Has been using warm compresses     Derek Alvarez RN  08/03/23 9215

## 2023-09-12 ENCOUNTER — OFFICE VISIT (OUTPATIENT)
Dept: PHYSICAL MEDICINE AND REHAB | Age: 78
End: 2023-09-12
Payer: MEDICARE

## 2023-09-12 VITALS
DIASTOLIC BLOOD PRESSURE: 60 MMHG | HEIGHT: 68 IN | WEIGHT: 159 LBS | SYSTOLIC BLOOD PRESSURE: 126 MMHG | BODY MASS INDEX: 24.1 KG/M2

## 2023-09-12 DIAGNOSIS — M47.816 LUMBAR SPONDYLOSIS: ICD-10-CM

## 2023-09-12 DIAGNOSIS — M51.37 DDD (DEGENERATIVE DISC DISEASE), LUMBOSACRAL: ICD-10-CM

## 2023-09-12 DIAGNOSIS — M79.18 MYOFASCIAL PAIN: Primary | ICD-10-CM

## 2023-09-12 DIAGNOSIS — M48.07 NEUROFORAMINAL STENOSIS OF LUMBOSACRAL SPINE: ICD-10-CM

## 2023-09-12 DIAGNOSIS — M54.16 LUMBAR RADICULOPATHY: ICD-10-CM

## 2023-09-12 DIAGNOSIS — M47.816 FACET HYPERTROPHY OF LUMBAR REGION: ICD-10-CM

## 2023-09-12 DIAGNOSIS — G89.4 CHRONIC PAIN SYNDROME: ICD-10-CM

## 2023-09-12 PROCEDURE — 99214 OFFICE O/P EST MOD 30 MIN: CPT | Performed by: NURSE PRACTITIONER

## 2023-09-12 PROCEDURE — 1123F ACP DISCUSS/DSCN MKR DOCD: CPT | Performed by: NURSE PRACTITIONER

## 2023-09-12 RX ORDER — BACLOFEN 10 MG/1
10 TABLET ORAL NIGHTLY PRN
Qty: 90 TABLET | Refills: 1 | Status: SHIPPED | OUTPATIENT
Start: 2023-09-12

## 2023-09-12 NOTE — PROGRESS NOTES
[address]  AND REHABILITATION CENTER  [facility]. [address] [place] [address]  Dept: [phone]  Dept Fax: [phone]: [phone]    Visit Date: 9/12/2023    Functionality Assessment/Goals Worksheet     On a scale of 0 (Does not Interfere) to 10 (Completely Interferes)     1. Which number describes how during the past week pain has interfered with       the following:  A. General Activity:  1  B. Mood: 0  C. Walking Ability:  0  D. Normal Work (Includes both work outside the home and housework):  1  E. Relations with Other People:   0  F. Sleep:   2  G. Enjoyment of Life:   0    2. Patient Prefers to Take their Pain Medications:     []  On a regular basis   [x]  Only when necessary    []  Does not take pain medications    3. What are the Patient's Goals/Expectations for Visiting Pain Management?      []  Learn about my pain    []  Receive Medication   []  Physical Therapy     []  Treat Depression   []  Receive Injections    []  Treat Sleep   []  Deal with Anxiety and Stress   []  Treat Opoid Dependence/Addiction   [x]  Other: just a follow up

## 2023-09-12 NOTE — PROGRESS NOTES
Chronic Pain/PM&R Clinic Note     Encounter Date: 9/12/23    Subjective:   Chief Complaint:   Chief Complaint   Patient presents with    Follow-up     History of Present Illness:   Magnolia Castellanos is a 69 y.o. female seen in the clinic initially on 12/12/2022 upon request from SIDDHARTHA Hernandez for her history of low back pain. She has a personal medical history of osteopenia, situational stress, insomnia. She reports that she also discussed for years ago, has been doing more things when house, lifting and moving things, as well as she had recently moved    Patient presents with complaints of bilateral low back pain, does go into the left buttocks and down the left leg to her toes at times. She also reports she has bilateral calf pain that is worse at night. She states this pain has been ongoing for about 3 months or more. She states that she was working in the yard, lifting pavers, raking, and started have some pain. She states it has just been worsening over time. She describes her calf pain is restless, cramping, charley horses, worse at night. She describes the low back and left buttocks pain as a constant ache, and when she does get the left leg symptoms feels like a burning, shocking. She denies any numbness, loss of bowel or bladder control. She reports nothing in particular makes the pain better or worse. She states certain movements or exercises that she did in therapy would exacerbate it. She has tried ice, heat, massage with no relief. She was in physical therapy, stated some movements felt like it helped her pain, but the mother made the pain worse. She states this is old to see what our plan was. She reports she is not sleeping well, pain will wake her up throughout the night, keep her up at night. She states when the pain is flared up really bad, she feels a little weakness in her left leg, will use a cane or hold onto things if she needs to but not time.   She states she has not had any

## 2023-10-31 SDOH — HEALTH STABILITY: PHYSICAL HEALTH: ON AVERAGE, HOW MANY MINUTES DO YOU ENGAGE IN EXERCISE AT THIS LEVEL?: 50 MIN

## 2023-10-31 SDOH — HEALTH STABILITY: PHYSICAL HEALTH: ON AVERAGE, HOW MANY DAYS PER WEEK DO YOU ENGAGE IN MODERATE TO STRENUOUS EXERCISE (LIKE A BRISK WALK)?: 5 DAYS

## 2023-10-31 ASSESSMENT — PATIENT HEALTH QUESTIONNAIRE - PHQ9
SUM OF ALL RESPONSES TO PHQ QUESTIONS 1-9: 0
SUM OF ALL RESPONSES TO PHQ9 QUESTIONS 1 & 2: 0
SUM OF ALL RESPONSES TO PHQ QUESTIONS 1-9: 0
SUM OF ALL RESPONSES TO PHQ QUESTIONS 1-9: 0
2. FEELING DOWN, DEPRESSED OR HOPELESS: 0
SUM OF ALL RESPONSES TO PHQ QUESTIONS 1-9: 0
1. LITTLE INTEREST OR PLEASURE IN DOING THINGS: 0

## 2023-10-31 ASSESSMENT — LIFESTYLE VARIABLES
HOW OFTEN DO YOU HAVE SIX OR MORE DRINKS ON ONE OCCASION: 1
HOW MANY STANDARD DRINKS CONTAINING ALCOHOL DO YOU HAVE ON A TYPICAL DAY: PATIENT DOES NOT DRINK
HOW MANY STANDARD DRINKS CONTAINING ALCOHOL DO YOU HAVE ON A TYPICAL DAY: 0
HOW OFTEN DO YOU HAVE A DRINK CONTAINING ALCOHOL: NEVER
HOW OFTEN DO YOU HAVE A DRINK CONTAINING ALCOHOL: 1

## 2023-11-02 ENCOUNTER — OFFICE VISIT (OUTPATIENT)
Dept: FAMILY MEDICINE CLINIC | Age: 78
End: 2023-11-02
Payer: MEDICARE

## 2023-11-02 VITALS
HEART RATE: 60 BPM | DIASTOLIC BLOOD PRESSURE: 70 MMHG | OXYGEN SATURATION: 99 % | BODY MASS INDEX: 24.01 KG/M2 | HEIGHT: 68 IN | WEIGHT: 158.4 LBS | RESPIRATION RATE: 18 BRPM | SYSTOLIC BLOOD PRESSURE: 132 MMHG | TEMPERATURE: 97.6 F

## 2023-11-02 DIAGNOSIS — F43.9 SITUATIONAL STRESS: ICD-10-CM

## 2023-11-02 DIAGNOSIS — H60.331 ACUTE SWIMMER'S EAR OF RIGHT SIDE: ICD-10-CM

## 2023-11-02 DIAGNOSIS — Z23 IMMUNIZATION DUE: ICD-10-CM

## 2023-11-02 DIAGNOSIS — K59.00 CONSTIPATION, UNSPECIFIED CONSTIPATION TYPE: ICD-10-CM

## 2023-11-02 DIAGNOSIS — Z00.00 MEDICARE ANNUAL WELLNESS VISIT, SUBSEQUENT: Primary | ICD-10-CM

## 2023-11-02 PROCEDURE — G0008 ADMIN INFLUENZA VIRUS VAC: HCPCS | Performed by: NURSE PRACTITIONER

## 2023-11-02 PROCEDURE — G0439 PPPS, SUBSEQ VISIT: HCPCS | Performed by: NURSE PRACTITIONER

## 2023-11-02 PROCEDURE — 99213 OFFICE O/P EST LOW 20 MIN: CPT | Performed by: NURSE PRACTITIONER

## 2023-11-02 PROCEDURE — 1123F ACP DISCUSS/DSCN MKR DOCD: CPT | Performed by: NURSE PRACTITIONER

## 2023-11-02 PROCEDURE — 90694 VACC AIIV4 NO PRSRV 0.5ML IM: CPT | Performed by: NURSE PRACTITIONER

## 2023-11-02 ASSESSMENT — PATIENT HEALTH QUESTIONNAIRE - PHQ9
2. FEELING DOWN, DEPRESSED OR HOPELESS: 0
SUM OF ALL RESPONSES TO PHQ QUESTIONS 1-9: 0
SUM OF ALL RESPONSES TO PHQ QUESTIONS 1-9: 0
SUM OF ALL RESPONSES TO PHQ9 QUESTIONS 1 & 2: 0
SUM OF ALL RESPONSES TO PHQ QUESTIONS 1-9: 0
1. LITTLE INTEREST OR PLEASURE IN DOING THINGS: 0
SUM OF ALL RESPONSES TO PHQ QUESTIONS 1-9: 0

## 2023-11-02 ASSESSMENT — LIFESTYLE VARIABLES
HOW OFTEN DO YOU HAVE A DRINK CONTAINING ALCOHOL: MONTHLY OR LESS
HOW MANY STANDARD DRINKS CONTAINING ALCOHOL DO YOU HAVE ON A TYPICAL DAY: 1 OR 2

## 2023-11-02 NOTE — PROGRESS NOTES
Kiko Gonsalez  1945    Are you sick today? no  Do you have allergies to medications, food, a vaccine component, or latex? yes  Have you ever had a serious reaction after receiving a vaccination? no  Do you have a long-term health problem with heart, lung, kidney, or metabolic disease (e.g. diabetes), asthma, a blood disorder, no spleen, complement component deficiency, a cochlear implant, or a spinal fluid leak? Are you on long-term aspirin therapy? no  Do you have cancer, leukemia, HIV/AIDS, or any other immune system problem? no  Do you have a parent, brother, or sister with an immune system problem? no  In the past 3 months, have you taken medications that affect your immune system, such as prednisone, other steroids, or anticancer drugs; drugs for the treatment of rheumatoid arthritis, Crohn's disease, or psoriasis; or have you had radiation treatment? No  Have you had a seizure or a brain or other nervous system problem? no  During the past year, have you received a transfusion of blood or blood products, or been given immune (gamma) globulin or an antiviral drug? no  For women: Are you pregnant or is there a chance you could become pregnant during the next month? no  Have you received any vaccinations in the past 4 weeks? no    Form Completed by: MB on 11/2/2023 at 2:18 PM EDT  Form Reviewed by: Yessy Banegas LPN on 41/9/9628 at 7:34 PM EDT    Did you bring your immunization card with you?  No

## 2023-11-02 NOTE — PROGRESS NOTES
Medicare Annual Wellness Visit    Gina Carter is here for Medicare AWV (Pt would like a flu shot. /Right ear looked at. Has water in it. /constipation)    Assessment & Plan   Medicare annual wellness visit, subsequent  Acute swimmer's ear of right side  Comments: Will start cortiporin otic. Keep ear dry during treatment. Will call if no improvement. Orders:  -     neomycin-polymyxin-hydrocortisone (CORTISPORIN) 3.5-21883-1 otic solution; Place 4 drops into the right ear 3 times daily for 10 days, Disp-10 mL, R-0Normal  Constipation, unspecified constipation type  Comments:  Continue Miralax nightly for 2 weeks. Call if no improvement or symptoms worsen. Encouraged adequate water intake. Patient reports high fiber diet. Immunization due  -     Influenza, FLUAD, (age 72 y+), IM, PF, 0.5 mL  Situational stress  Assessment & Plan:   Well-controlled, continue current medications and lifestyle modifications recommended  Recommendations for Preventive Services Due: see orders and patient instructions/AVS.  Recommended screening schedule for the next 5-10 years is provided to the patient in written form: see Patient Instructions/AVS.     Return in 2 weeks (on 11/16/2023). Subjective   The following acute and/or chronic problems were also addressed today:  Patient states diet overall is healthy and she does exercise regularly. Stays active and gets out often and is social.   Zoloft working well for mood. Is taking 50 mg daily. Patient states about 3 weeks ago after swimming she was washing her hair in the sink and the water ran in her ear and caused pain. Staets has had pain since then. States it has improved but she still has some pain \"deep in the ear. \" No fever or chills. No URI symptoms. No hearing loss or discharge. Patient states she went on vacation a few weeks ago. She did ride in a car a lot and did have a different diet. After returning from vacation, she has had issues with constipation.  States she

## 2023-11-16 ENCOUNTER — OFFICE VISIT (OUTPATIENT)
Dept: FAMILY MEDICINE CLINIC | Age: 78
End: 2023-11-16
Payer: MEDICARE

## 2023-11-16 VITALS
DIASTOLIC BLOOD PRESSURE: 60 MMHG | HEART RATE: 59 BPM | OXYGEN SATURATION: 98 % | HEIGHT: 68 IN | BODY MASS INDEX: 24.34 KG/M2 | RESPIRATION RATE: 18 BRPM | WEIGHT: 160.6 LBS | TEMPERATURE: 97.6 F | SYSTOLIC BLOOD PRESSURE: 120 MMHG

## 2023-11-16 DIAGNOSIS — K59.00 CONSTIPATION, UNSPECIFIED CONSTIPATION TYPE: Primary | ICD-10-CM

## 2023-11-16 DIAGNOSIS — H69.81 EUSTACHIAN TUBE DYSFUNCTION, RIGHT: ICD-10-CM

## 2023-11-16 DIAGNOSIS — J30.2 SEASONAL ALLERGIC RHINITIS, UNSPECIFIED TRIGGER: ICD-10-CM

## 2023-11-16 PROCEDURE — 1123F ACP DISCUSS/DSCN MKR DOCD: CPT | Performed by: NURSE PRACTITIONER

## 2023-11-16 PROCEDURE — 99213 OFFICE O/P EST LOW 20 MIN: CPT | Performed by: NURSE PRACTITIONER

## 2023-11-16 NOTE — PROGRESS NOTES
[facility]  [address] [facility]  Dept: [phone]  Loc: [address] [address] (:  1945) is a 69 y.o. female, here for evaluation of the following chief complaint(s):  2 Week Follow-Up (Right ear feels better but pt stated she feels like there may still be an infection in it. Pt stated it doesn't feel as good at the left ear. )      ASSESSMENT/PLAN:  1. Constipation, unspecified constipation type  2. Eustachian tube dysfunction, right  3. Seasonal allergic rhinitis, unspecified trigger    Constipation is improving. Has been using Miralax and has added more fluid to her diet. Will call if symptoms do not continue to improve. Suspect Eustachian tube dysfunction related to allergic rhinitis. Patient has Nasonex at home but has not been using it. States her worst times are fall and winter. Will start Nasonex daily, has supply does not need prescription. If no improvement in 2 weeks she will let me know and will refer to ENT for further evaluation. Return for Regular follow up as scheduled and as needed. SUBJECTIVE/OBJECTIVE:  Patient presents for follow up on constipation and ear pain. States right ear does feel better but states still feels plugged and does not feel \"the same\" as her left ear. No pain. No fever or chills. Some muffling of sound in ear. No URI. States constipation has improved. Has been using Miralax and has increased the water in her diet. Review of Systems   Constitutional:  Negative for chills and fever. HENT:  Negative for congestion and sore throat. Eyes:  Negative for pain and visual disturbance. Respiratory:  Negative for cough, chest tightness and shortness of breath. Cardiovascular:  Negative for chest pain and palpitations. Gastrointestinal:  Negative for abdominal pain, nausea and vomiting. Genitourinary:  Negative for decreased urine volume and dysuria.

## 2023-11-28 ASSESSMENT — ENCOUNTER SYMPTOMS
NAUSEA: 0
SORE THROAT: 0
SHORTNESS OF BREATH: 0
VOMITING: 0
COUGH: 0
ABDOMINAL PAIN: 0
EYE PAIN: 0
CHEST TIGHTNESS: 0

## 2024-02-26 ASSESSMENT — PATIENT HEALTH QUESTIONNAIRE - PHQ9
SUM OF ALL RESPONSES TO PHQ QUESTIONS 1-9: 0
SUM OF ALL RESPONSES TO PHQ9 QUESTIONS 1 & 2: 0
2. FEELING DOWN, DEPRESSED OR HOPELESS: NOT AT ALL
SUM OF ALL RESPONSES TO PHQ QUESTIONS 1-9: 0
SUM OF ALL RESPONSES TO PHQ9 QUESTIONS 1 & 2: 0
1. LITTLE INTEREST OR PLEASURE IN DOING THINGS: NOT AT ALL
2. FEELING DOWN, DEPRESSED OR HOPELESS: 0
1. LITTLE INTEREST OR PLEASURE IN DOING THINGS: 0
SUM OF ALL RESPONSES TO PHQ QUESTIONS 1-9: 0
SUM OF ALL RESPONSES TO PHQ QUESTIONS 1-9: 0

## 2024-02-27 ENCOUNTER — HOSPITAL ENCOUNTER (OUTPATIENT)
Age: 79
Discharge: HOME | End: 2024-02-27
Payer: MEDICARE

## 2024-02-27 ENCOUNTER — OFFICE VISIT (OUTPATIENT)
Dept: FAMILY MEDICINE CLINIC | Age: 79
End: 2024-02-27
Payer: MEDICARE

## 2024-02-27 ENCOUNTER — HOSPITAL ENCOUNTER (OUTPATIENT)
Dept: GENERAL RADIOLOGY | Age: 79
Discharge: HOME | End: 2024-02-27
Payer: MEDICARE

## 2024-02-27 VITALS
TEMPERATURE: 97.3 F | RESPIRATION RATE: 16 BRPM | DIASTOLIC BLOOD PRESSURE: 60 MMHG | WEIGHT: 160.2 LBS | SYSTOLIC BLOOD PRESSURE: 138 MMHG | HEIGHT: 68 IN | BODY MASS INDEX: 24.28 KG/M2 | HEART RATE: 63 BPM | OXYGEN SATURATION: 97 %

## 2024-02-27 DIAGNOSIS — M85.80 OSTEOPENIA, UNSPECIFIED LOCATION: ICD-10-CM

## 2024-02-27 DIAGNOSIS — R07.9 LEFT-SIDED CHEST PAIN: Primary | ICD-10-CM

## 2024-02-27 DIAGNOSIS — R07.9 LEFT-SIDED CHEST PAIN: ICD-10-CM

## 2024-02-27 DIAGNOSIS — Z78.0 MENOPAUSE PRESENT: ICD-10-CM

## 2024-02-27 PROCEDURE — 1123F ACP DISCUSS/DSCN MKR DOCD: CPT | Performed by: NURSE PRACTITIONER

## 2024-02-27 PROCEDURE — 99214 OFFICE O/P EST MOD 30 MIN: CPT | Performed by: NURSE PRACTITIONER

## 2024-02-27 PROCEDURE — 1090F PRES/ABSN URINE INCON ASSESS: CPT | Performed by: NURSE PRACTITIONER

## 2024-02-27 PROCEDURE — G8400 PT W/DXA NO RESULTS DOC: HCPCS | Performed by: NURSE PRACTITIONER

## 2024-02-27 PROCEDURE — G8420 CALC BMI NORM PARAMETERS: HCPCS | Performed by: NURSE PRACTITIONER

## 2024-02-27 PROCEDURE — 71046 X-RAY EXAM CHEST 2 VIEWS: CPT

## 2024-02-27 PROCEDURE — G8427 DOCREV CUR MEDS BY ELIG CLIN: HCPCS | Performed by: NURSE PRACTITIONER

## 2024-02-27 PROCEDURE — 1036F TOBACCO NON-USER: CPT | Performed by: NURSE PRACTITIONER

## 2024-02-27 PROCEDURE — G8484 FLU IMMUNIZE NO ADMIN: HCPCS | Performed by: NURSE PRACTITIONER

## 2024-02-27 PROCEDURE — 93000 ELECTROCARDIOGRAM COMPLETE: CPT | Performed by: NURSE PRACTITIONER

## 2024-02-27 RX ORDER — PREDNISONE 20 MG/1
40 TABLET ORAL DAILY
Qty: 10 TABLET | Refills: 0 | Status: SHIPPED | OUTPATIENT
Start: 2024-02-27 | End: 2024-03-03

## 2024-02-27 ASSESSMENT — ENCOUNTER SYMPTOMS
ORTHOPNEA: 0
SHORTNESS OF BREATH: 0
ABDOMINAL PAIN: 0
NAUSEA: 0
VOMITING: 0
COUGH: 0
SPUTUM PRODUCTION: 0
BACK PAIN: 0
HEMOPTYSIS: 0

## 2024-02-28 ENCOUNTER — TELEPHONE (OUTPATIENT)
Age: 79
End: 2024-02-28

## 2024-02-28 NOTE — TELEPHONE ENCOUNTER
----- Message from SIDDHARTHA Ohara - CNP sent at 2/27/2024  4:56 PM EST -----  Chest xray is normal.

## 2024-03-04 RX ORDER — BACLOFEN 10 MG/1
10 TABLET ORAL NIGHTLY PRN
Qty: 90 TABLET | Refills: 1 | Status: SHIPPED | OUTPATIENT
Start: 2024-03-04

## 2024-03-08 ENCOUNTER — HOSPITAL ENCOUNTER (OUTPATIENT)
Dept: WOMENS IMAGING | Age: 79
Discharge: HOME | End: 2024-03-08
Payer: MEDICARE

## 2024-03-08 DIAGNOSIS — Z78.0 MENOPAUSE PRESENT: ICD-10-CM

## 2024-03-08 DIAGNOSIS — M85.80 OSTEOPENIA, UNSPECIFIED LOCATION: ICD-10-CM

## 2024-03-08 PROCEDURE — 77080 DXA BONE DENSITY AXIAL: CPT

## 2024-03-14 ENCOUNTER — TELEPHONE (OUTPATIENT)
Dept: FAMILY MEDICINE CLINIC | Age: 79
End: 2024-03-14

## 2024-03-14 NOTE — TELEPHONE ENCOUNTER
----- Message from SIDDHARTHA Ohara - CNP sent at 3/13/2024  9:45 AM EDT -----  Dexa shows osteopenia and increased 10 year risk fracture. She should make an appointment to discuss results.

## 2024-03-25 ENCOUNTER — OFFICE VISIT (OUTPATIENT)
Dept: FAMILY MEDICINE CLINIC | Age: 79
End: 2024-03-25
Payer: MEDICARE

## 2024-03-25 ENCOUNTER — HOSPITAL ENCOUNTER (OUTPATIENT)
Age: 79
Discharge: HOME | End: 2024-03-25
Payer: MEDICARE

## 2024-03-25 VITALS
DIASTOLIC BLOOD PRESSURE: 52 MMHG | RESPIRATION RATE: 16 BRPM | SYSTOLIC BLOOD PRESSURE: 120 MMHG | TEMPERATURE: 97.6 F | HEART RATE: 64 BPM | WEIGHT: 160.6 LBS | HEIGHT: 68 IN | BODY MASS INDEX: 24.34 KG/M2 | OXYGEN SATURATION: 96 %

## 2024-03-25 DIAGNOSIS — M81.0 AGE-RELATED OSTEOPOROSIS WITHOUT CURRENT PATHOLOGICAL FRACTURE: ICD-10-CM

## 2024-03-25 DIAGNOSIS — M81.0 AGE-RELATED OSTEOPOROSIS WITHOUT CURRENT PATHOLOGICAL FRACTURE: Primary | ICD-10-CM

## 2024-03-25 PROBLEM — M85.89 OSTEOPENIA OF MULTIPLE SITES: Status: ACTIVE | Noted: 2022-06-01

## 2024-03-25 LAB
25(OH)D3 SERPL-MCNC: 41 NG/ML (ref 30–100)
ALBUMIN SERPL BCG-MCNC: 4.4 G/DL (ref 3.5–5.1)
ALP SERPL-CCNC: 93 U/L (ref 38–126)
ALT SERPL W/O P-5'-P-CCNC: 16 U/L (ref 11–66)
ANION GAP SERPL CALC-SCNC: 12 MEQ/L (ref 8–16)
AST SERPL-CCNC: 24 U/L (ref 5–40)
BILIRUB SERPL-MCNC: 0.2 MG/DL (ref 0.3–1.2)
BUN SERPL-MCNC: 11 MG/DL (ref 7–22)
CALCIUM SERPL-MCNC: 9 MG/DL (ref 8.5–10.5)
CHLORIDE SERPL-SCNC: 100 MEQ/L (ref 98–111)
CO2 SERPL-SCNC: 26 MEQ/L (ref 23–33)
CREAT SERPL-MCNC: 0.6 MG/DL (ref 0.4–1.2)
GFR SERPL CREATININE-BSD FRML MDRD: > 90 ML/MIN/1.73M2
GLUCOSE SERPL-MCNC: 70 MG/DL (ref 70–108)
POTASSIUM SERPL-SCNC: 4.1 MEQ/L (ref 3.5–5.2)
PROT SERPL-MCNC: 7.1 G/DL (ref 6.1–8)
SODIUM SERPL-SCNC: 138 MEQ/L (ref 135–145)

## 2024-03-25 PROCEDURE — 80053 COMPREHEN METABOLIC PANEL: CPT

## 2024-03-25 PROCEDURE — 1123F ACP DISCUSS/DSCN MKR DOCD: CPT | Performed by: NURSE PRACTITIONER

## 2024-03-25 PROCEDURE — G8427 DOCREV CUR MEDS BY ELIG CLIN: HCPCS | Performed by: NURSE PRACTITIONER

## 2024-03-25 PROCEDURE — 82306 VITAMIN D 25 HYDROXY: CPT

## 2024-03-25 PROCEDURE — 1090F PRES/ABSN URINE INCON ASSESS: CPT | Performed by: NURSE PRACTITIONER

## 2024-03-25 PROCEDURE — 36415 COLL VENOUS BLD VENIPUNCTURE: CPT

## 2024-03-25 PROCEDURE — G8420 CALC BMI NORM PARAMETERS: HCPCS | Performed by: NURSE PRACTITIONER

## 2024-03-25 PROCEDURE — G8399 PT W/DXA RESULTS DOCUMENT: HCPCS | Performed by: NURSE PRACTITIONER

## 2024-03-25 PROCEDURE — 1036F TOBACCO NON-USER: CPT | Performed by: NURSE PRACTITIONER

## 2024-03-25 PROCEDURE — G8484 FLU IMMUNIZE NO ADMIN: HCPCS | Performed by: NURSE PRACTITIONER

## 2024-03-25 PROCEDURE — 99214 OFFICE O/P EST MOD 30 MIN: CPT | Performed by: NURSE PRACTITIONER

## 2024-03-25 RX ORDER — TERBINAFINE HYDROCHLORIDE 250 MG/1
250 TABLET ORAL DAILY
COMMUNITY
Start: 2024-03-12

## 2024-03-25 RX ORDER — ALENDRONATE SODIUM 70 MG/1
70 TABLET ORAL
Qty: 4 TABLET | Refills: 1 | Status: SHIPPED | OUTPATIENT
Start: 2024-03-25

## 2024-03-25 ASSESSMENT — ENCOUNTER SYMPTOMS
SHORTNESS OF BREATH: 0
VOMITING: 0
ABDOMINAL PAIN: 0
SORE THROAT: 0
EYE PAIN: 0
COUGH: 0
CHEST TIGHTNESS: 0
NAUSEA: 0

## 2024-03-25 NOTE — PROGRESS NOTES
[facility] PROFESSIONAL [facility] MEDICINE  [address]  [facility] [address]  Dept: [phone]  Loc: [phone]     Susannah Inman (:  1945) is a 78 y.o. female, here for evaluation of the following chief complaint(s):  dexa scan (Follow up.  Pt denies any concerns today. )      ASSESSMENT/PLAN:  1. Age-related osteoporosis without current pathological fracture  Assessment & Plan:   Uncontrolled, lifestyle modifications recommended and continue daily calcium and vitamin D. Add weight bearing exercise. Will check Vitamin D and labs. Will start alendronate.   Orders:  -     Comprehensive Metabolic Panel; Future  -     Vitamin D 25 Hydroxy; Future  -     alendronate (FOSAMAX) 70 MG tablet; Take 1 tablet by mouth every 7 days, Disp-4 tablet, R-1Normal      Return in about 6 months (around 2024) for AWV and 6 mo fu.    SUBJECTIVE/OBJECTIVE:  Patient presents for follow up on Dexa scan results. Discussed that while T Score showed osteopenia, risk for major hip fracture was increased and per guidelines should be treated. Patient states she does aerobic exercise but it is in the pool. Takes 500 mg Calcium daily and 2000 ius Vitamin D daily. States sister has osteoporosis and has been on medication for years. Mother also had osteoporosis and was on injections.        Review of Systems   Constitutional:  Negative for chills and fever.   HENT:  Negative for congestion and sore throat.    Eyes:  Negative for pain and visual disturbance.   Respiratory:  Negative for cough, chest tightness and shortness of breath.    Cardiovascular:  Negative for chest pain and palpitations.   Gastrointestinal:  Negative for abdominal pain, nausea and vomiting.   Genitourinary:  Negative for decreased urine volume and dysuria.   Skin:  Negative for rash.   Neurological:  Negative for dizziness, weakness and headaches.   Psychiatric/Behavioral:  Negative for dysphoric mood. The patient

## 2024-03-25 NOTE — ASSESSMENT & PLAN NOTE
Uncontrolled, lifestyle modifications recommended and continue daily calcium and vitamin D. Add weight bearing exercise. Will check Vitamin D and labs. Will start alendronate.

## 2024-03-26 ENCOUNTER — TELEPHONE (OUTPATIENT)
Dept: FAMILY MEDICINE CLINIC | Age: 79
End: 2024-03-26

## 2024-03-26 NOTE — TELEPHONE ENCOUNTER
----- Message from SIDDHARTHA Ohara - CNP sent at 3/26/2024  8:10 AM EDT -----  Labs are stable. Vitamin D is in normal range.

## 2024-04-25 DIAGNOSIS — F43.9 SITUATIONAL STRESS: ICD-10-CM

## 2024-05-15 DIAGNOSIS — M81.0 AGE-RELATED OSTEOPOROSIS WITHOUT CURRENT PATHOLOGICAL FRACTURE: ICD-10-CM

## 2024-05-15 RX ORDER — ALENDRONATE SODIUM 70 MG/1
70 TABLET ORAL
Qty: 4 TABLET | Refills: 3 | Status: SHIPPED | OUTPATIENT
Start: 2024-05-15

## 2024-05-15 NOTE — TELEPHONE ENCOUNTER
Susannah Inman called requesting a refill on the following medications:  Requested Prescriptions     Pending Prescriptions Disp Refills    alendronate (FOSAMAX) 70 MG tablet 4 tablet 1     Sig: Take 1 tablet by mouth every 7 days       Date of last visit: 3/25/2024  Date of next visit (if applicable):Visit date not found  Date of last refill: 3/25/24  Pharmacy Name: [facility],  Angy Wilkinson LPN

## 2024-06-10 ENCOUNTER — HOSPITAL ENCOUNTER (OUTPATIENT)
Dept: WOMENS IMAGING | Age: 79
Discharge: HOME | End: 2024-06-10
Payer: MEDICARE

## 2024-06-10 VITALS — BODY MASS INDEX: 23.76 KG/M2 | WEIGHT: 154 LBS

## 2024-06-10 DIAGNOSIS — Z12.31 VISIT FOR SCREENING MAMMOGRAM: ICD-10-CM

## 2024-06-10 PROCEDURE — 77063 BREAST TOMOSYNTHESIS BI: CPT

## 2024-08-14 DIAGNOSIS — F43.9 SITUATIONAL STRESS: ICD-10-CM

## 2024-08-15 NOTE — TELEPHONE ENCOUNTER
Susannah Inman called requesting a refill on the following medications:  Requested Prescriptions     Pending Prescriptions Disp Refills    sertraline (ZOLOFT) 50 MG tablet 90 tablet 3     Sig: Take 1 tablet by mouth daily       Date of last visit: 3/25/2024  Date of next visit (if applicable):Visit date not found  Date of last refill: 6/2/2023  Pharmacy Name: [facility] [address]       Thanks,  Deb Hurt MA

## 2024-08-21 SDOH — ECONOMIC STABILITY: FOOD INSECURITY: WITHIN THE PAST 12 MONTHS, YOU WORRIED THAT YOUR FOOD WOULD RUN OUT BEFORE YOU GOT MONEY TO BUY MORE.: NEVER TRUE

## 2024-08-21 SDOH — ECONOMIC STABILITY: FOOD INSECURITY: WITHIN THE PAST 12 MONTHS, THE FOOD YOU BOUGHT JUST DIDN'T LAST AND YOU DIDN'T HAVE MONEY TO GET MORE.: NEVER TRUE

## 2024-08-21 SDOH — HEALTH STABILITY: PHYSICAL HEALTH: ON AVERAGE, HOW MANY DAYS PER WEEK DO YOU ENGAGE IN MODERATE TO STRENUOUS EXERCISE (LIKE A BRISK WALK)?: 5 DAYS

## 2024-08-21 SDOH — HEALTH STABILITY: PHYSICAL HEALTH: ON AVERAGE, HOW MANY MINUTES DO YOU ENGAGE IN EXERCISE AT THIS LEVEL?: 60 MIN

## 2024-08-21 ASSESSMENT — LIFESTYLE VARIABLES
HOW OFTEN DO YOU HAVE A DRINK CONTAINING ALCOHOL: 3
HOW OFTEN DO YOU HAVE A DRINK CONTAINING ALCOHOL: 2-4 TIMES A MONTH
HOW MANY STANDARD DRINKS CONTAINING ALCOHOL DO YOU HAVE ON A TYPICAL DAY: 1
HOW MANY STANDARD DRINKS CONTAINING ALCOHOL DO YOU HAVE ON A TYPICAL DAY: 1 OR 2
HOW OFTEN DO YOU HAVE SIX OR MORE DRINKS ON ONE OCCASION: 1

## 2024-08-21 ASSESSMENT — PATIENT HEALTH QUESTIONNAIRE - PHQ9
2. FEELING DOWN, DEPRESSED OR HOPELESS: NOT AT ALL
SUM OF ALL RESPONSES TO PHQ QUESTIONS 1-9: 0
SUM OF ALL RESPONSES TO PHQ QUESTIONS 1-9: 0
SUM OF ALL RESPONSES TO PHQ9 QUESTIONS 1 & 2: 0
SUM OF ALL RESPONSES TO PHQ QUESTIONS 1-9: 0
SUM OF ALL RESPONSES TO PHQ QUESTIONS 1-9: 0
1. LITTLE INTEREST OR PLEASURE IN DOING THINGS: NOT AT ALL

## 2024-08-23 ENCOUNTER — OFFICE VISIT (OUTPATIENT)
Dept: FAMILY MEDICINE CLINIC | Age: 79
End: 2024-08-23
Payer: MEDICARE

## 2024-08-23 VITALS
BODY MASS INDEX: 23.49 KG/M2 | WEIGHT: 155 LBS | HEART RATE: 71 BPM | OXYGEN SATURATION: 99 % | DIASTOLIC BLOOD PRESSURE: 72 MMHG | HEIGHT: 68 IN | RESPIRATION RATE: 16 BRPM | SYSTOLIC BLOOD PRESSURE: 110 MMHG

## 2024-08-23 DIAGNOSIS — S31.809A WOUND OF BUTTOCK, UNSPECIFIED LATERALITY, INITIAL ENCOUNTER: ICD-10-CM

## 2024-08-23 DIAGNOSIS — F43.9 SITUATIONAL STRESS: ICD-10-CM

## 2024-08-23 DIAGNOSIS — Z00.00 MEDICARE ANNUAL WELLNESS VISIT, SUBSEQUENT: Primary | ICD-10-CM

## 2024-08-23 PROCEDURE — G0439 PPPS, SUBSEQ VISIT: HCPCS | Performed by: NURSE PRACTITIONER

## 2024-08-23 PROCEDURE — 1123F ACP DISCUSS/DSCN MKR DOCD: CPT | Performed by: NURSE PRACTITIONER

## 2024-08-23 NOTE — PROGRESS NOTES
Medicare Annual Wellness Visit    Susannah Inman is here for Medicare AWV and Medication Refill (Patient is requesting a refill of mupriocin ointment she had previously got from her old Dr in [facility])    Assessment & Plan   Medicare annual wellness visit, subsequent  Situational stress  Assessment & Plan:   Well-controlled,   Orders:  -     sertraline (ZOLOFT) 50 MG tablet; Take 1 tablet by mouth daily, Disp-90 tablet, R-3Normal  Wound of buttock, unspecified laterality, initial encounter  -     mupirocin (BACTROBAN) 2 % ointment; Apply topically 3 times daily., Disp-15 g, R-1, Normal    Recommendations for Preventive Services Due: see orders and patient instructions/AVS.  Recommended screening schedule for the next 5-10 years is provided to the patient in written form: see Patient Instructions/AVS.     Return in about 1 year (around 8/23/2025) for AWV.     Subjective   The following acute and/or chronic problems were also addressed today:  Zoloft is working well. Mood is stable. States she tries to decrease it because she wants to get off of it but then symptoms start coming back so she is staying with current dose. Eats healthy. Exercises regularly - pool.     Patient's complete Health Risk Assessment and screening values have been reviewed and are found in Flowsheets. The following problems were reviewed today and where indicated follow up appointments were made and/or referrals ordered.    No Positive Risk Factors identified today.                                    Objective   Vitals:    08/23/24 0802   BP: 110/72   Pulse: 71   Resp: 16   SpO2: 99%   Weight: 70.3 kg (155 lb)   Height: 1.715 m (5' 7.5\")      Body mass index is 23.92 kg/m².        Physical Exam  Vitals reviewed.   Constitutional:       General: She is not in acute distress.     Appearance: Normal appearance. She is normal weight. She is not ill-appearing.   HENT:      Head: Normocephalic.   Eyes:      Conjunctiva/sclera: Conjunctivae normal.

## 2024-08-31 DIAGNOSIS — M81.0 AGE-RELATED OSTEOPOROSIS WITHOUT CURRENT PATHOLOGICAL FRACTURE: ICD-10-CM

## 2024-09-03 RX ORDER — ALENDRONATE SODIUM 70 MG/1
70 TABLET ORAL
Qty: 12 TABLET | Refills: 3 | Status: SHIPPED | OUTPATIENT
Start: 2024-09-03

## 2024-09-03 NOTE — TELEPHONE ENCOUNTER
Susannah Inman called requesting a refill on the following medications:  Requested Prescriptions     Pending Prescriptions Disp Refills    alendronate (FOSAMAX) 70 MG tablet [Pharmacy Med Name: ALENDRONATE 70MG TABLETS] 12 tablet      Sig: TAKE 1 TABLET BY MOUTH ONCE A WEEK, AS DIRECTED       Date of last visit: 8/23/2024  Date of next visit (if applicable):Visit date not found  Date of last refill: 05/15/24  [facility] Name: [facility],  Rosana Sandy LPN

## 2024-12-26 ENCOUNTER — OFFICE VISIT (OUTPATIENT)
Dept: FAMILY MEDICINE CLINIC | Age: 79
End: 2024-12-26
Payer: MEDICARE

## 2024-12-26 VITALS
RESPIRATION RATE: 20 BRPM | WEIGHT: 160 LBS | SYSTOLIC BLOOD PRESSURE: 138 MMHG | DIASTOLIC BLOOD PRESSURE: 84 MMHG | TEMPERATURE: 98.4 F | HEART RATE: 70 BPM | BODY MASS INDEX: 24.69 KG/M2

## 2024-12-26 DIAGNOSIS — J01.90 ACUTE BACTERIAL SINUSITIS: Primary | ICD-10-CM

## 2024-12-26 DIAGNOSIS — B96.89 ACUTE BACTERIAL SINUSITIS: Primary | ICD-10-CM

## 2024-12-26 PROCEDURE — 1090F PRES/ABSN URINE INCON ASSESS: CPT | Performed by: NURSE PRACTITIONER

## 2024-12-26 PROCEDURE — 1123F ACP DISCUSS/DSCN MKR DOCD: CPT | Performed by: NURSE PRACTITIONER

## 2024-12-26 PROCEDURE — G8420 CALC BMI NORM PARAMETERS: HCPCS | Performed by: NURSE PRACTITIONER

## 2024-12-26 PROCEDURE — G8399 PT W/DXA RESULTS DOCUMENT: HCPCS | Performed by: NURSE PRACTITIONER

## 2024-12-26 PROCEDURE — G8427 DOCREV CUR MEDS BY ELIG CLIN: HCPCS | Performed by: NURSE PRACTITIONER

## 2024-12-26 PROCEDURE — G8482 FLU IMMUNIZE ORDER/ADMIN: HCPCS | Performed by: NURSE PRACTITIONER

## 2024-12-26 PROCEDURE — 99213 OFFICE O/P EST LOW 20 MIN: CPT | Performed by: NURSE PRACTITIONER

## 2024-12-26 PROCEDURE — 1036F TOBACCO NON-USER: CPT | Performed by: NURSE PRACTITIONER

## 2024-12-26 PROCEDURE — 1159F MED LIST DOCD IN RCRD: CPT | Performed by: NURSE PRACTITIONER

## 2024-12-26 RX ORDER — BENZONATATE 200 MG/1
200 CAPSULE ORAL 3 TIMES DAILY PRN
Qty: 30 CAPSULE | Refills: 0 | Status: SHIPPED | OUTPATIENT
Start: 2024-12-26

## 2024-12-26 ASSESSMENT — ENCOUNTER SYMPTOMS
SHORTNESS OF BREATH: 0
COUGH: 1
SINUS PRESSURE: 1

## 2024-12-26 NOTE — PROGRESS NOTES
Susannah Inman (:  1945) is a 79 y.o. female,Established patient, here for evaluation of the following chief complaint(s):  Cough        Assessment & Plan   1. Acute bacterial sinusitis  - Acute  - Start oral antibiotic for suspected bacterial sinusitis  - Benzonatate, OTC treatments as needed for symptomatic relief  -     amoxicillin-clavulanate (AUGMENTIN) 875-125 MG per tablet; Take 1 tablet by mouth 2 times daily for 10 days, Disp-20 tablet, R-0Normal  -     benzonatate (TESSALON) 200 MG capsule; Take 1 capsule by mouth 3 times daily as needed for Cough, Disp-30 capsule, R-0Normal      Return if symptoms worsen or fail to improve.       Subjective     Patient presents for evaluation of acute illness.    Cough  This is a new problem. The current episode started 1 to 4 weeks ago (2 weeks). The problem has been unchanged. The cough is Productive of purulent sputum. Associated symptoms include headaches, nasal congestion and postnasal drip. Pertinent negatives include no fever or shortness of breath. The symptoms are aggravated by exercise. She has tried OTC cough suppressant (mucinex, sudafed) for the symptoms. The treatment provided moderate relief.       Review of Systems   Constitutional:  Negative for fever.   HENT:  Positive for congestion, postnasal drip and sinus pressure.    Respiratory:  Positive for cough (phlegm). Negative for shortness of breath.    Neurological:  Positive for headaches.          Objective   Physical Exam  Vitals and nursing note reviewed.   Constitutional:       General: She is awake. She is not in acute distress.     Appearance: Normal appearance. She is ill-appearing. She is not toxic-appearing.   HENT:      Head: Normocephalic and atraumatic.      Right Ear: Hearing, ear canal and external ear normal. Tympanic membrane is bulging. Tympanic membrane is not erythematous.      Left Ear: Hearing, ear canal and external ear normal. Tympanic membrane is bulging. Tympanic

## 2025-06-13 ENCOUNTER — HOSPITAL ENCOUNTER (OUTPATIENT)
Dept: WOMENS IMAGING | Age: 80
Discharge: HOME | End: 2025-06-13
Payer: MEDICARE

## 2025-06-13 VITALS — HEIGHT: 68 IN | BODY MASS INDEX: 23.19 KG/M2 | WEIGHT: 153 LBS

## 2025-06-13 DIAGNOSIS — Z12.31 VISIT FOR SCREENING MAMMOGRAM: ICD-10-CM

## 2025-06-13 PROCEDURE — 77063 BREAST TOMOSYNTHESIS BI: CPT

## 2025-08-23 ENCOUNTER — HOSPITAL ENCOUNTER (EMERGENCY)
Age: 80
Discharge: HOME | End: 2025-08-23
Payer: MEDICARE

## 2025-08-23 ENCOUNTER — APPOINTMENT (OUTPATIENT)
Dept: GENERAL RADIOLOGY | Age: 80
End: 2025-08-23
Payer: MEDICARE

## 2025-08-23 VITALS
RESPIRATION RATE: 16 BRPM | TEMPERATURE: 98.4 F | DIASTOLIC BLOOD PRESSURE: 77 MMHG | BODY MASS INDEX: 23.42 KG/M2 | SYSTOLIC BLOOD PRESSURE: 144 MMHG | OXYGEN SATURATION: 98 % | WEIGHT: 154 LBS | HEART RATE: 61 BPM

## 2025-08-23 DIAGNOSIS — M17.11 OSTEOARTHRITIS OF RIGHT KNEE, UNSPECIFIED OSTEOARTHRITIS TYPE: Primary | ICD-10-CM

## 2025-08-23 PROCEDURE — 99213 OFFICE O/P EST LOW 20 MIN: CPT

## 2025-08-23 PROCEDURE — 73564 X-RAY EXAM KNEE 4 OR MORE: CPT

## 2025-08-23 ASSESSMENT — ENCOUNTER SYMPTOMS
RESPIRATORY NEGATIVE: 1
GASTROINTESTINAL NEGATIVE: 1

## 2025-08-23 ASSESSMENT — PAIN DESCRIPTION - LOCATION: LOCATION: KNEE

## 2025-08-23 ASSESSMENT — PAIN SCALES - GENERAL: PAINLEVEL_OUTOF10: 9

## 2025-08-23 ASSESSMENT — PAIN DESCRIPTION - ORIENTATION: ORIENTATION: RIGHT

## 2025-08-23 ASSESSMENT — PAIN - FUNCTIONAL ASSESSMENT
PAIN_FUNCTIONAL_ASSESSMENT: 0-10
PAIN_FUNCTIONAL_ASSESSMENT: PREVENTS OR INTERFERES WITH MANY ACTIVE NOT PASSIVE ACTIVITIES

## 2025-08-23 ASSESSMENT — PAIN DESCRIPTION - FREQUENCY: FREQUENCY: CONTINUOUS

## (undated) DEVICE — TOWEL,OR,DSP,ST,BLUE,STD,4/PK,20PK/CS: Brand: MEDLINE

## (undated) DEVICE — SYRINGE MED 3ML CLR PLAS STD N CTRL LUERLOCK TIP DISP

## (undated) DEVICE — 6 ML SYRINGE LUER-LOCK TIP: Brand: MONOJECT

## (undated) DEVICE — Device

## (undated) DEVICE — NEEDLE SPINAL 22GA L3.5IN SPINOCAN

## (undated) DEVICE — MARKER,SKIN,WI/RULER AND LABELS: Brand: MEDLINE

## (undated) DEVICE — NEEDLE HYPO 18GA L1.5IN THN WALL PIVOTING SHLD BVL ORIENTED

## (undated) DEVICE — GLOVE BIOGEL POWDER FREE SZ 8

## (undated) DEVICE — COUNTER NDL 10 COUNT HLD 20 FOAM BLK SGL MAG